# Patient Record
Sex: FEMALE | Race: WHITE | Employment: OTHER | URBAN - METROPOLITAN AREA
[De-identification: names, ages, dates, MRNs, and addresses within clinical notes are randomized per-mention and may not be internally consistent; named-entity substitution may affect disease eponyms.]

---

## 2021-03-26 ENCOUNTER — TELEPHONE (OUTPATIENT)
Dept: WOUND CARE | Age: 86
End: 2021-03-26

## 2021-03-26 NOTE — TELEPHONE ENCOUNTER
Patient Appointment Reminder and [phone] Screening     Attempted to reach patient. Call unanswered, message left to call back.

## 2021-03-29 ENCOUNTER — HOSPITAL ENCOUNTER (OUTPATIENT)
Dept: WOUND CARE | Age: 86
Discharge: HOME | End: 2021-03-29
Payer: MEDICARE

## 2021-03-29 VITALS
TEMPERATURE: 97.5 F | SYSTOLIC BLOOD PRESSURE: 153 MMHG | RESPIRATION RATE: 16 BRPM | HEART RATE: 69 BPM | DIASTOLIC BLOOD PRESSURE: 71 MMHG

## 2021-03-29 DIAGNOSIS — L89.314 PRESSURE INJURY OF RIGHT BUTTOCK, STAGE 4 (HCC): ICD-10-CM

## 2021-03-29 PROBLEM — R63.4 WEIGHT LOSS: Status: ACTIVE | Noted: 2021-03-29

## 2021-03-29 PROBLEM — R15.9 FECAL INCONTINENCE: Status: ACTIVE | Noted: 2021-03-29

## 2021-03-29 PROBLEM — G20 PARKINSON DISEASE (HCC): Status: ACTIVE | Noted: 2021-03-29

## 2021-03-29 PROBLEM — L89.610 PRESSURE INJURY OF RIGHT HEEL, UNSTAGEABLE (HCC): Status: ACTIVE | Noted: 2021-03-29

## 2021-03-29 PROBLEM — R32 URINARY INCONTINENCE: Status: ACTIVE | Noted: 2021-03-29

## 2021-03-29 PROCEDURE — 99204 OFFICE O/P NEW MOD 45 MIN: CPT

## 2021-03-29 PROCEDURE — 11045 DBRDMT SUBQ TISS EACH ADDL: CPT

## 2021-03-29 PROCEDURE — 11042 DBRDMT SUBQ TIS 1ST 20SQCM/<: CPT

## 2021-03-29 NOTE — PROGRESS NOTES
Wound Center  Progress Note / Procedure Note    Subjective:     Chief Complaint:  Clarisse Chandler is a 80 y.o.  female  with multiple wounds of few months duration. Referred by:  formerly [facility]    HPI:     Stay in Assisted living for 2 month - Oct- dec  dtr got call in dec- patient had wound  Recommended she go on hospice  dtr latha patient home  On hospice until last week- took her off as she was having chest pain  Turned out to have low Hgb- received 1uPRBC  Wants to keep her off hospice  Doing well  Appetite much better  She will be taking care of her at home    So far has been sitting up most of the day  Has HH    History/Chart/Medications reviewed    Wound caused by:pressure  Current wound care:See flowsheet  Offloading wound: yes, Group 2, Roho    Appetite: fair, and improving  Wound associated pain: See flowsheet  Diabetic: no  Smoker:no  ROS: no N/V/D, no T/chills; no local rash, no chest pain or shortness of breath, no headache or dizzyness    Review of Systems:  Constitutional: Negative    HENT: Negative. Eyes: Negative. Respiratory: Negative. Cardiovascular: Negative. Gastrointestinal: Negative. Genitourinary: Negative. Musculoskeletal: Negative. Skin: Wound  Neurological: parkinson's  Endo/Heme/Allergies: Negative. Psychiatric/Behavioral: Negative. Objective:     Physical Exam:   See flowsheet / nursing notes for vitals  Visit Vitals  BP (!) 153/71 (BP 1 Location: Left upper arm, BP Patient Position: Lying right side)   Pulse 69   Temp 97.5 °F (36.4 °C)   Resp 16     General: NAD. Hygiene good  Psych: cooperative. No anxiety or depression. Normal mood and affect. Neuro: alert a Otherwise nonfocal.  Derm: NDecreased turgor for age, dry skin  HEENT: Normocephalic, atraumatic. EOMI. Conjunctiva clear. No scleral icterus. Neck: Normal range of motion. No masses.   Chest: Respirations nonlabored  Lower extremities: color normal; temperature normal. Nails dystrophic    Ulcer Description: See Flowsheet           Data Review:              Assessment/Plan     80 y.o. female with parkinsons, recent weight loss, came off hospice last week    -R/ ischium ulcer, stage 4  Some slough  necrotic  Necessitates debridement  for wound healing and to prevent/heal infection  See below      -R/ heel unstageable  Dry eschar    - urinary inoctinence    -fecal incontinence    As per dtr, wound not get contaminated by either    Appetite  Good  Depends on what it is    Following discussed with patient  CG  Needs :  Serial debridement- debrided today- see note below    Good local wound care  Dressing:   Ischium:  Wound vac, in the interim, pack with aquacel ag  Heel: betadine wet to dry  Frequency : three times a week     Continue Home Health      -Nutrition optimization  Increase protein, healthy fats  Reduce sugars/starches    -Good offloading  Has group 2  Reposition q 2 hrs  Limit sitting to 30 min 3x/day  Has Hector  Has offloading boot for heels    CG understood and agrees with plan. Questions answered. Serial visits and serial debridements also discussed. Follow up with me in 1 week        Procedure:     Ulcer assessment: Due to presence of necrotic tissue within the wound bed, ulcer requires debridement. Procedure: Debridement:   The indication for debridement was reviewed with patient. Risks of procedure (bleeding, infection, pain) were discussed with patient/POA and consent signed on first visit. Questions were answered      Muscle excisional debridement  R/ iscihum  Indication: to remove necrotic tissue/ vitalized & devitalized tissue/  infected tissue/ through skin, Subcutaneous, muscle/fascia/periosteal layer of wound bed  Time out done.   Consent in chart   Anesthesia: Topical  lidocaine jelly  Instrument: curette  Residual Necrosis: Present and scored  Bleeding: <1ml   Hemostasis: Pressure   Patient tolerated procedure well   Procedural Pain: 0  Post - procedural pain: 0    Post debridement measurements:see below  Surface area debrided: <20 sq. cm    WOUND POA CONDITIONS    Wound Heel Right;Lateral #1 (Active)   Wound Image   03/29/21 0900   Wound Etiology Pressure Stage unstageble 03/29/21 0900   Dressing Status Intact 03/29/21 0900   Cleansed Cleansed with saline 03/29/21 0900   Wound Length (cm) 1 cm 03/29/21 0900   Wound Width (cm) 1 cm 03/29/21 0900   Wound Depth (cm) 0.1 cm 03/29/21 0900   Wound Surface Area (cm^2) 1 cm^2 03/29/21 0900   Wound Volume (cm^3) 0.1 cm^3 03/29/21 0900   Wound Assessment Eschar dry 03/29/21 0900   Drainage Amount None 03/29/21 0900   Wound Odor None 03/29/21 0900   Gely-Wound/Incision Assessment Dry/flaky 03/29/21 0900   Edges Flat/open edges 03/29/21 0900   Wound Thickness Description Full thickness 03/29/21 0900   Number of days: 0       Wound Ischial Right #2 (Active)   Wound Image   03/29/21 0900   Wound Etiology Pressure Stage 4 03/29/21 0900   Dressing Status Intact 03/29/21 0900   Cleansed Cleansed with saline 03/29/21 0900   Wound Length (cm) 2 cm 03/29/21 0900   Wound Width (cm) 2 cm 03/29/21 0900   Wound Depth (cm) 2 cm 03/29/21 0900   Wound Surface Area (cm^2) 4 cm^2 03/29/21 0900   Wound Volume (cm^3) 8 cm^3 03/29/21 0900   Post-Procedure Length (cm) 2 cm 03/29/21 0927   Post-Procedure Width (cm) 2 cm 03/29/21 0927   Post-Procedure Depth (cm) 2.3 cm 03/29/21 0927   Post-Procedure Surface Area (cm^2) 4 cm^2 03/29/21 0927   Post-Procedure Volume (cm^3) 9.2 cm^3 03/29/21 0927   Distance Tunneling (cm) 3 cm 03/29/21 0900   Direction of Tunnel 9 o'clock 03/29/21 0900   Wound Assessment Pink/red/slough 03/29/21 0900   Drainage Amount Large 03/29/21 0900   Drainage Description Serosanguinous 03/29/21 0900   Wound Odor Mild 03/29/21 0900   Gely-Wound/Incision Assessment Intact; Blanchable erythema 03/29/21 0900   Edges Epibole (rolled edges) 03/29/21 0900   Wound Thickness Description Full thickness 03/29/21 0900   Number of days: 0               -------    Past Medical History:   Diagnosis Date    Anemia NEC     GERD (gastroesophageal reflux disease)     Heart failure (HCC)     Hypertension     [facility] disease ([facility]     Peripheral vascular disease ([facility]       Past Surgical History:   Procedure Laterality Date    HX GYN      hysterectomy    HX ORTHOPAEDIC      hip replacement     History reviewed. No pertinent family history. Social History     Tobacco Use    Smoking status: Never Smoker    Smokeless tobacco: Never Used   Substance Use Topics    Alcohol use: No       Prior to Admission medications    Medication Sig Start Date End Date Taking? Authorizing Provider   calcium-vitamin D (OYSTER SHELL CALCIUM-VIT D3) 250-125 mg-unit tablet Take 1 Tab by mouth daily. Yes Sanaz, MD Fausto   magnesium oxide (MAG-OX) 400 mg tablet Take 400 mg by mouth daily. Yes Fausto Yo MD   ascorbic acid (VITAMIN C) 500 mg tablet Take 500 mg by mouth. Yes Fausto Yo MD   bismuth subsalicylate (BISMATROL) 262 mg/15 mL suspension Take 30 mL by mouth every six (6) hours as needed for Indigestion. Yes Fausto Yo MD   ferrous sulfate (IRON) 325 mg (65 mg Iron) tablet Take 325 mg by mouth daily (before breakfast). Yes Fausto Yo MD   carbidopa-levodopa (SINEMET)  mg per tablet Take 1 Tab by mouth three (3) times daily. Yes Fausto Yo MD   tolterodine (DETROL) 2 mg tablet Take 2 mg by mouth two (2) times a day. Patient takes 4 mg    Fausto Yo MD   pantoprazole (PROTONIX) 40 mg tablet Take 40 mg by mouth daily. Fausto Yo MD   acetaminophen (TYLENOL) 325 mg tablet Take 650 mg by mouth every four (4) hours as needed for Pain.     Fausto Yo MD     Allergies   Allergen Reactions    Latex Rash          Signed By: Haylee Rosario MD     March 29, 2021

## 2021-03-29 NOTE — DISCHARGE INSTRUCTIONS
Discharge Instructions for  [address], [facility] [address]  Telephone: [phone]    NAME:  Clarisse Chandler  YOB: 1927  MEDICAL RECORD NUMBER:  [id number]  DATE:  3/29/2021    Dressings:           Wound Location Right Ishium   Today at clinic: Cleanse with saline and pack with Aquacel AG, cover with gauze, ABD and secure with tape. Change every Monday, Wednesday AND Friday. Home Health: Wound Vac with black foam at 125 mmHg to Right Ischial wound. Change every Monday, Wednesday AND Friday. Dressings:           Wound Location Right heel    Betadine wet to dry and secure with roll gauze. Dietary:  [x] Diet as tolerated: [x] No Added Salt:  [x] Increase Protein: [] Other:   Activity:  [x] Activity as tolerated:  [x] Offload, turn and reposition every 2 hours            Return Appointment:  [x] ECF or Home Healthcare: [address] [place] [address]  [] Wound Assessment:  [x] Return Appointment: With Dr. Roosevelt Carr  in  [address] [place] [address])  [] Ordered tests:      Electronically signed on 3/29/2021 at [facility] 11: Should you experience any significant changes in your wound(s) or have questions about your wound care, please contact the [address] [place] [address] at [address] [place] [address] 8:00 am - 4:30. If you need help with your wound outside these hours and cannot wait until we are again available, contact your PCP or go to the hospital emergency room. PLEASE NOTE: IF YOU ARE UNABLE TO OBTAIN WOUND SUPPLIES, CONTINUE TO USE THE SUPPLIES YOU HAVE AVAILABLE UNTIL YOU ARE ABLE TO REACH US. IT IS MOST IMPORTANT TO KEEP THE WOUND COVERED AT ALL TIMES.       Physician Signature:_______________________    Date: ___________ Time:  ____________

## 2021-03-29 NOTE — WOUND CARE
03/29/21 1046 Wound Heel Right;Lateral #1 Date First Assessed/Time First Assessed: 03/29/21 0908   Present on Hospital Admission: Yes  Primary Wound Type: Pressure Injury  Location: Heel  Wound Location Orientation: Right;Lateral  Wound Description: #1 Dressing/Treatment Betadine swabs/Povidone Iodine;Gauze dressing/dressing sponge;Roll gauze Wound Ischial Right #2 Date First Assessed/Time First Assessed: 03/29/21 0908   Present on Hospital Admission: Yes  Primary Wound Type: Pressure Injury  Location: Ischial  Wound Location Orientation: Right  Wound Description: #2 Dressing/Treatment Alginate with Ag;Gauze dressing/dressing sponge;Roll gauze Discharge Condition: Stable Pain: 0 Ambulatory Status: Wheelchair Discharge Destination: Home Transportation: Car Accompanied by: Self Discharge instructions reviewed with Patient and copy or written instructions have been provided. All questions/concerns have been addressed at this time.

## 2021-03-29 NOTE — WOUND CARE
03/29/21 0900 Anesthetic Anesthetic 4% Lidocaine Cream  
Right Leg Edema Point of Measurement Leg circumference 25 cm Ankle circumference 18 cm Left Leg Edema Point of Measurement Leg circumference 25 cm Ankle circumference 17 cm Peripheral Vascular Peripheral Vascular (WDL) X  
LLE Peripheral Vascular Capillary Refill Less than/equal to 3 seconds Color Appropriate for race Temperature Cool Sensation Present Pedal Pulse Doppler RLE Peripheral Vascular Capillary Refill Less than/equal to 3 seconds Color Appropriate for race Temperature Cool Sensation Present Pedal Pulse Doppler Wound Heel Right;Lateral #1 Date First Assessed/Time First Assessed: 03/29/21 0908   Present on Hospital Admission: Yes  Primary Wound Type: Pressure Injury  Location: Heel  Wound Location Orientation: Right;Lateral  Wound Description: #1 Wound Image Wound Etiology Pressure Stage 3 Dressing Status Intact Cleansed Cleansed with saline Wound Length (cm) 1 cm Wound Width (cm) 1 cm Wound Depth (cm) 0.1 cm Wound Surface Area (cm^2) 1 cm^2 Wound Volume (cm^3) 0.1 cm^3 Wound Assessment Eschar dry Drainage Amount None Wound Odor None Gely-Wound/Incision Assessment Dry/flaky Edges Flat/open edges Wound Thickness Description Full thickness Wound Ischial Right #2 Date First Assessed/Time First Assessed: 03/29/21 0908   Present on Hospital Admission: Yes  Primary Wound Type: Pressure Injury  Location: Ischial  Wound Location Orientation: Right  Wound Description: #2 Wound Image Wound Etiology Pressure Stage 4 Dressing Status Intact Cleansed Cleansed with saline Wound Length (cm) 2 cm Wound Width (cm) 2 cm Wound Depth (cm) 2 cm Wound Surface Area (cm^2) 4 cm^2 Wound Volume (cm^3) 8 cm^3 Distance Tunneling (cm) 3 cm Direction of Tunnel 9 o'clock Wound Assessment Pink/red Drainage Amount Large Drainage Description Serosanguinous Wound Odor Mild Gely-Wound/Incision Assessment Intact; Blanchable erythema Edges Epibole (rolled edges) Wound Thickness Description Full thickness Pain 1 Pain Scale 1 Numeric (0 - 10) Pain Intensity 1 0 Visit Vitals BP (!) 153/71 (BP 1 Location: Left upper arm, BP Patient Position: Lying right side) Pulse 69 Temp 97.5 °F (36.4 °C) Resp 16

## 2021-04-02 ENCOUNTER — TELEPHONE (OUTPATIENT)
Dept: WOUND CARE | Age: 86
End: 2021-04-02

## 2021-04-02 NOTE — TELEPHONE ENCOUNTER
Patient Appointment Reminder and [phone] Screening      Have you been tested for or have you been around anyone that has confirmed or been suspected positive for Covid-19 in past 14 days? No       If yes then  CANCEL VISIT AND MAKE A VIRTUAL VISIT IF POSSIBLE  :     Stay home and monitor your health  · Stay home for 14 days after your last contact with a person who has COVID-19   · Watch for fever (100. 4? F), cough, shortness of breath, or other symptoms of COVID-19 advise CDC website for more information  · If possible, stay away from others, especially people who are at higher risk for getting very sick from COVID-19     Does Patient have fever and/or lower respiratory symptoms, (shortness of breath, difficulty breathing, cough) loss taste or smell, sore throat, muscle or body aches, nasal congestion or runny nose, nausea/vomiting,diarrhea ? No      If yes then CANCEL VISIT AND MAKE A VIRTUAL VISIT IF POSSIBLE :     Referred to PCP or to the closest ED   No         Open travel questions and document patient responses. If No stop here and give patient reminder time for appointment and ask them please limit to having only 1 person accompany to appointment if necessary, no children under 18 are allowed at visits. Please call into office day of appointment to notify arrival.Remind all patients and caretakers they must wear a mask when entering into the wound clinic. Reminder Appointment  date and time given: Yes        [email or web address]     [email or web address]

## 2021-04-05 ENCOUNTER — HOSPITAL ENCOUNTER (OUTPATIENT)
Dept: WOUND CARE | Age: 86
Discharge: HOME | End: 2021-04-05
Payer: MEDICARE

## 2021-04-05 VITALS
SYSTOLIC BLOOD PRESSURE: 125 MMHG | TEMPERATURE: 97.7 F | DIASTOLIC BLOOD PRESSURE: 71 MMHG | RESPIRATION RATE: 16 BRPM | HEART RATE: 74 BPM

## 2021-04-05 PROCEDURE — 11043 DBRDMT MUSC&/FSCA 1ST 20/<: CPT

## 2021-04-05 RX ORDER — SUCRALFATE 1 G/1
1 TABLET ORAL 4 TIMES DAILY
COMMUNITY

## 2021-04-05 RX ORDER — OXYBUTYNIN CHLORIDE 10 MG/1
10 TABLET, EXTENDED RELEASE ORAL DAILY
COMMUNITY
End: 2021-07-21

## 2021-04-05 RX ORDER — FUROSEMIDE 20 MG/1
TABLET ORAL DAILY
COMMUNITY
End: 2022-02-02

## 2021-04-05 RX ORDER — FLUDROCORTISONE ACETATE 0.1 MG/1
0.1 TABLET ORAL DAILY
COMMUNITY

## 2021-04-05 NOTE — WOUND CARE
04/05/21 1023   Wound Heel Right;Lateral #1   Date First Assessed/Time First Assessed: 03/29/21 0908   Present on Hospital Admission: Yes  Primary Wound Type: Pressure Injury  Location: Heel  Wound Location Orientation: Right;Lateral  Wound Description: #1   Dressing/Treatment   (betadine wet to dry, rolled gauze. )   Wound Ischial Right #2   Date First Assessed/Time First Assessed: 03/29/21 0908   Present on Hospital Admission: Yes  Primary Wound Type: Pressure Injury  Location: Ischial  Wound Location Orientation: Right  Wound Description: #2   Dressing/Treatment Alginate with Ag;ABD pad       Discharge Condition: Stable     Pain: 0    Ambulatory Status: Wheelchair     Discharge Destination: Home     Transportation: Car    Accompanied by: Self  and Family/Caregiver     Discharge instructions reviewed with Patient and Family/Caregiver  and copy or written instructions have been provided. All questions/concerns have been addressed at this time.

## 2021-04-05 NOTE — PROGRESS NOTES
Wound Center  Progress Note / Procedure Note    Subjective:     Chief Complaint:  Dali Patino is a 80 y.o.  female  with multiple wounds of few months duration. Referred by:  [facility]    HPI:     Stay in Assisted living for 2 month - Oct- dec  dtr got call in dec- patient had wound  Recommended she go on hospice  dtr latha patient home  On hospice until last week- took her off as she was having chest pain  Turned out to have low Hgb- received 1uPRBC  Wants to keep her off hospice  Doing well  Appetite much better  She will be taking care of her at home    So far has been sitting up most of the day  Has HH    History/Chart/Medications reviewed    Since last visit:  No new issues  Offloading better    Wound caused by:pressure  Current wound care:See flowsheet  Offloading wound: yes, Group 2, Roho    Appetite: fair, and improving  Wound associated pain: See flowsheet  Diabetic: no  Smoker:no  ROS: no N/V/D, no T/chills; no local rash, no chest pain or shortness of breath, no headache or dizzyness      Objective:     Physical Exam:   See flowsheet / nursing notes for vitals  Visit Vitals  /71 (BP 1 Location: Left upper arm, BP Patient Position: Supine)   Pulse 74   Temp 97.7 °F (36.5 °C)   Resp 16     General: NAD. Hygiene good  Psych: cooperative. No anxiety or depression. Normal mood and affect. Neuro: alert a Otherwise nonfocal.  Derm: NDecreased turgor for age, dry skin  HEENT: Normocephalic, atraumatic. EOMI. Conjunctiva clear. No scleral icterus. Neck: Normal range of motion. No masses.   Chest: Respirations nonlabored  Lower extremities: color normal; temperature normal. Nails dystrophic    Ulcer Description:   See Flowsheet           Data Review:              Assessment/Plan     80 y.o. female with parkinsons, recent weight loss, came off hospice last week    -R/ ischium ulcer, stage 4  stable  mild slough  necrotic  Necessitates debridement  for wound healing and to prevent/heal infection  See below      -R/ heel unstageable  Dry eschar    - PAD  PVR reviewed:done on 3/5/21  Normal ankle pressures due to medial wall calcifications  Digit pressures reduced  D/w dtr    - urinary inoctinence    -fecal incontinence    As per dtr, wound not get contaminated by either    Appetite  Good  Depends on what it is    Following discussed with patient  CG  Needs :  Serial debridement- debrided today- see note below    Good local wound care  Dressing:   Ischium:  Wound vac at home,  pack with aquacel ag today here  Heel: betadine wet to dry  Frequency : three times a week     [address] prefers [facility]- they can switch out at their discretion  (we ordered [employer])      -Nutrition optimization  Increase protein, healthy fats  Reduce sugars/starches    -Good offloading  Has group 2  Reposition q 2 hrs  Limit sitting to 30 min 3x/day  Has Hector  Has offloading boot for heels    CG understood and agrees with plan. Questions answered. Serial visits and serial debridements also discussed. Follow up with me in 1 week        Procedure:     Ulcer assessment: Due to presence of necrotic tissue within the wound bed, ulcer requires debridement. Procedure: Debridement:   The indication for debridement was reviewed with patient. Risks of procedure (bleeding, infection, pain) were discussed with patient/POA and consent signed on first visit. Questions were answered      Muscle excisional debridement  R/ ischium  Indication: to remove necrotic tissue/ vitalized & devitalized tissue/  infected tissue/ through skin, Subcutaneous, muscle/fascia/periosteal layer of wound bed  Time out done.   Consent in chart   Anesthesia: Topical  lidocaine jelly  Instrument: curette  Residual Necrosis: Present and scored  Bleeding: <1ml   Hemostasis: Pressure   Patient tolerated procedure well   Procedural Pain: 0  Post - procedural pain: 0    Post debridement measurements:see below  Surface area debrided: <20 sq. cm  WOUND POA CONDITIONS    Wound Heel Right;Lateral #1 (Active)   Wound Image   04/05/21 0927   Wound Etiology Pressure Unstageable 04/05/21 0927   Dressing Status Intact 03/29/21 0900   Cleansed Cleansed with saline 03/29/21 0900   Dressing/Treatment Betadine swabs/Povidone Iodine;Gauze dressing/dressing sponge;Roll gauze 03/29/21 1046   Wound Length (cm) 1 cm 04/05/21 0927   Wound Width (cm) 1 cm 04/05/21 0927   Wound Depth (cm) 0.1 cm 04/05/21 0927   Wound Surface Area (cm^2) 1 cm^2 04/05/21 0927   Change in Wound Size % 0 04/05/21 0927   Wound Volume (cm^3) 0.1 cm^3 04/05/21 0927   Wound Healing % 0 04/05/21 0927   Wound Assessment Eschar dry 04/05/21 0927   Drainage Amount None 04/05/21 0927   Wound Odor None 04/05/21 0927   Gely-Wound/Incision Assessment Dry/flaky 04/05/21 0927   Edges Flat/open edges 04/05/21 0927   Wound Thickness Description Full thickness 04/05/21 0927   Number of days: 7       Wound Ischial Right #2 (Active)   Wound Image   04/05/21 0927   Wound Etiology Pressure Stage 4 04/05/21 0927   Dressing Status Intact 04/05/21 0927   Cleansed Cleansed with saline 04/05/21 0927   Dressing/Treatment Alginate with Ag;Gauze dressing/dressing sponge;Roll gauze 03/29/21 1046   Wound Length (cm) 1.5 cm 04/05/21 0927   Wound Width (cm) 1.5 cm 04/05/21 0927   Wound Depth (cm) 2 cm 04/05/21 0927   Wound Surface Area (cm^2) 2.25 cm^2 04/05/21 0927   Change in Wound Size % 43.75 04/05/21 0927   Wound Volume (cm^3) 4.5 cm^3 04/05/21 0927   Wound Healing % 44 04/05/21 0927   Post-Procedure Length (cm) 1.5 cm 04/05/21 0959   Post-Procedure Width (cm) 1.5 cm 04/05/21 0959   Post-Procedure Depth (cm) 2.7 cm 04/05/21 0959   Post-Procedure Surface Area (cm^2) 2.25 cm^2 04/05/21 0959   Post-Procedure Volume (cm^3) 6.08 cm^3 04/05/21 0959   Distance Tunneling (cm) 2.5 cm 04/05/21 0927   Direction of Tunnel 9 o'clock 04/05/21 0927   Wound Assessment Pink/red/mild slough 04/05/21 0927   Drainage Amount Moderate 04/05/21 0927 Drainage Description Serosanguinous 04/05/21 0927   Wound Odor None 04/05/21 0927   Gely-Wound/Incision Assessment Intact 04/05/21 0927   Edges Flat/open edges 04/05/21 0927   Wound Thickness Description Full thickness 04/05/21 0927   Number of days: 7               -------    Past Medical History:   Diagnosis Date    Anemia NEC     GERD (gastroesophageal reflux disease)     Heart failure (HCC)     Hypertension     [facility] disease ([facility]     Peripheral vascular disease ([facility]       Past Surgical History:   Procedure Laterality Date    HX GYN      hysterectomy    HX ORTHOPAEDIC      hip replacement     Family History   Problem Relation Age of Onset    Heart Disease Father       Social History     Tobacco Use    Smoking status: Never Smoker    Smokeless tobacco: Never Used   Substance Use Topics    Alcohol use: No       Prior to Admission medications    Medication Sig Start Date End Date Taking? Authorizing Provider   furosemide (Lasix) 20 mg tablet Take  by mouth daily. Yes Provider, Historical   sucralfate (CARAFATE) 1 gram tablet Take 1 g by mouth four (4) times daily. Yes Provider, Historical   oxybutynin chloride XL (DITROPAN XL) 10 mg CR tablet Take 10 mg by mouth daily. Yes Provider, Historical   fludrocortisone (FLORINEF) 0.1 mg tablet Take 0.1 mg by mouth daily. Yes Provider, Historical   sennosides (SENNACON PO) Take 10 mL by mouth daily. Yes Provider, Historical   lactulose (CHRONULAC) 10 gram/15 mL solution Take 10 g by mouth two (2) times a day. Yes Provider, Historical   calcium-vitamin D (OYSTER SHELL CALCIUM-VIT D3) 250-125 mg-unit tablet Take 1 Tab by mouth daily. Fausto Yo MD   tolterodine (DETROL) 2 mg tablet Take 2 mg by mouth two (2) times a day. Patient takes 4 mg    Fausto Yo MD   magnesium oxide (MAG-OX) 400 mg tablet Take 400 mg by mouth daily. Fausto Yo MD   pantoprazole (PROTONIX) 40 mg tablet Take 40 mg by mouth daily.     Fausto Yo MD   ascorbic acid (VITAMIN C) 500 mg tablet Take 500 mg by mouth. Fausto Yo MD   acetaminophen (TYLENOL) 325 mg tablet Take 650 mg by mouth every four (4) hours as needed for Pain. Fausto Yo MD   bismuth subsalicylate (BISMATROL) 262 mg/15 mL suspension Take 30 mL by mouth every six (6) hours as needed for Indigestion. Fausto Yo MD   ferrous sulfate (IRON) 325 mg (65 mg Iron) tablet Take 325 mg by mouth daily (before breakfast). Fausto Yo MD   carbidopa-levodopa (SINEMET)  mg per tablet Take 1 Tab by mouth three (3) times daily.     Fausto Yo MD     Allergies   Allergen Reactions    Latex Rash          Signed By: Kiley Asencio MD     April 5, 2021

## 2021-04-05 NOTE — WOUND CARE
Visit Vitals  /71 (BP 1 Location: Left upper arm, BP Patient Position: Supine)   Pulse 74   Temp 97.7 °F (36.5 °C)   Resp 16        04/05/21 0927   Right Leg Edema Point of Measurement   Leg circumference 25 cm   Ankle circumference 18 cm   RLE Peripheral Vascular    Capillary Refill Less than/equal to 3 seconds   Color Appropriate for race   Temperature Warm   Sensation Present   Pedal Pulse Palpable   Wound Heel Right;Lateral #1   Date First Assessed/Time First Assessed: 03/29/21 0908   Present on Hospital Admission: Yes  Primary Wound Type: Pressure Injury  Location: Heel  Wound Location Orientation: Right;Lateral  Wound Description: #1   Wound Image    Wound Etiology Pressure Unstageable   Wound Length (cm) 1 cm   Wound Width (cm) 1 cm   Wound Depth (cm) 0.1 cm   Wound Surface Area (cm^2) 1 cm^2   Change in Wound Size % 0   Wound Volume (cm^3) 0.1 cm^3   Wound Healing % 0   Wound Assessment Eschar dry   Drainage Amount None   Wound Odor None   Gely-Wound/Incision Assessment Dry/flaky   Edges Flat/open edges   Wound Thickness Description Full thickness   Wound Ischial Right #2   Date First Assessed/Time First Assessed: 03/29/21 0908   Present on Hospital Admission: Yes  Primary Wound Type: Pressure Injury  Location: Ischial  Wound Location Orientation: Right  Wound Description: #2   Wound Image    Wound Etiology Pressure Stage 4   Dressing Status Intact   Cleansed Cleansed with saline   Wound Length (cm) 1.5 cm   Wound Width (cm) 1.5 cm   Wound Depth (cm) 2 cm   Wound Surface Area (cm^2) 2.25 cm^2   Change in Wound Size % 43.75   Wound Volume (cm^3) 4.5 cm^3   Wound Healing % 44   Distance Tunneling (cm) 2.5 cm   Direction of Tunnel 9 o'clock   Wound Assessment Pink/red   Drainage Amount Moderate   Drainage Description Serosanguinous   Wound Odor None   Gely-Wound/Incision Assessment Intact   Edges Flat/open edges   Wound Thickness Description Full thickness

## 2021-04-05 NOTE — DISCHARGE INSTRUCTIONS
Discharge Instructions for  [address], [facility] [address]  Telephone: [phone]    NAME:  Leda Catalan  YOB: 1927  MEDICAL RECORD NUMBER:  [id number]  DATE:  4/5/2021    Dressings:           Wound Location Right Ishium   Today at clinic: Cleanse with saline and pack with Aquacel AG (cut into spiral/rope to fill wound bed), cover with gauze, ABD and secure with tape. Change every Monday, Wednesday AND Friday. Home Health: Wound Vac with black foam at 125 mmHg to Right Ischial wound. Change every Monday, and Thursday. Dressings:           Wound Location Right heel    Betadine moistened gauze, then dry gauze and secure with roll gauze. Dietary:  [x] Diet as tolerated: [x] No Added Salt:  [x] Increase Protein: [] Other:   Activity:  [x] Activity as tolerated:  [x] Offload, turn and reposition every 2 hours            Return Appointment:  [x] ECF or Home Healthcare: [address] [place] [address]  [] Wound Assessment:  [x] Return Appointment: With Dr. Drew Dugan  in  1 [facility])  [] Ordered tests:      Electronically signed on 4/5/2021 at 10:00 AM     Stephanie Keller 281: Should you experience any significant changes in your wound(s) or have questions about your wound care, please contact the [address] [place] [address] at [address] [place] [address] 8:00 am - 4:30. If you need help with your wound outside these hours and cannot wait until we are again available, contact your PCP or go to the hospital emergency room. PLEASE NOTE: IF YOU ARE UNABLE TO OBTAIN WOUND SUPPLIES, CONTINUE TO USE THE SUPPLIES YOU HAVE AVAILABLE UNTIL YOU ARE ABLE TO REACH US. IT IS MOST IMPORTANT TO KEEP THE WOUND COVERED AT ALL TIMES.       Physician Signature:_______________________    Date: ___________ Time:  ____________

## 2021-04-13 ENCOUNTER — TELEPHONE (OUTPATIENT)
Dept: WOUND CARE | Age: 86
End: 2021-04-13

## 2021-04-13 NOTE — TELEPHONE ENCOUNTER
Telephoned patient to remind patient of appointment tomorrow. No answer to phone let message for patient.

## 2021-04-14 ENCOUNTER — HOSPITAL ENCOUNTER (OUTPATIENT)
Dept: WOUND CARE | Age: 86
Discharge: HOME | End: 2021-04-14
Payer: MEDICARE

## 2021-04-14 VITALS
RESPIRATION RATE: 18 BRPM | HEART RATE: 76 BPM | TEMPERATURE: 97.7 F | SYSTOLIC BLOOD PRESSURE: 163 MMHG | DIASTOLIC BLOOD PRESSURE: 76 MMHG

## 2021-04-14 PROCEDURE — 11043 DBRDMT MUSC&/FSCA 1ST 20/<: CPT

## 2021-04-14 NOTE — PROGRESS NOTES
04/14/21 1351   Wound Heel Right;Lateral #1   Date First Assessed/Time First Assessed: 03/29/21 0908   Present on Hospital Admission: Yes  Primary Wound Type: Pressure Injury  Location: Heel  Wound Location Orientation: Right;Lateral  Wound Description: #1   Wound Image    Wound Length (cm) 1 cm   Wound Width (cm) 0.5 cm   Wound Depth (cm) 0.1 cm   Wound Surface Area (cm^2) 0.5 cm^2   Change in Wound Size % 50   Wound Volume (cm^3) 0.05 cm^3   Wound Healing % 50   Wound Assessment Eschar dry   Drainage Amount None   Wound Odor None   Wound Ischial Right #2   Date First Assessed/Time First Assessed: 03/29/21 0908   Present on Hospital Admission: Yes  Primary Wound Type: Pressure Injury  Location: Ischial  Wound Location Orientation: Right  Wound Description: #2   Wound Image    Wound Length (cm) 2 cm   Wound Width (cm) 1.5 cm   Wound Depth (cm) 2.2 cm   Wound Surface Area (cm^2) 3 cm^2   Change in Wound Size % 25   Wound Volume (cm^3) 6.6 cm^3   Wound Healing % 18   Wound Assessment Pink/red   Drainage Amount Moderate   Drainage Description Serosanguinous   Wound Odor Mild   Gely-Wound/Incision Assessment Blanchable erythema     Visit Vitals  BP (!) 163/76   Pulse 76   Temp 97.7 °F (36.5 °C)   Resp 18

## 2021-04-14 NOTE — WOUND CARE
04/14/21 1509 Wound Heel Right;Lateral #1 Date First Assessed/Time First Assessed: 03/29/21 0908   Present on Hospital Admission: Yes  Primary Wound Type: Pressure Injury  Location: Heel  Wound Location Orientation: Right;Lateral  Wound Description: #1 Dressing/Treatment  
(Paint with betadine and leave open to air) Wound Ischial Right #2 Date First Assessed/Time First Assessed: 03/29/21 0908   Present on Hospital Admission: Yes  Primary Wound Type: Pressure Injury  Location: Ischial  Wound Location Orientation: Right  Wound Description: #2 Dressing/Treatment Negative Pressure Wound Therapy Negative Pressure NAME:  Chadd Hilliard YOB: 1927 MEDICAL RECORD NUMBER:  [id number] DATE:  4/14/2021  Applied Negative Pressure to  Right ischial wound(s)/ulcer(s).  [x] Applied skin barrier prep to mao-wound.  [x] Cut strips of plastic drape to picture frame wound so that mao-wound is     covered with the drape.  [x] If bridging dressing to less prominent site, cover any intact skin that will come in contact with the Negative Pressure Therapy sponge, gauze or channel drain with plastic drape. The sponge should never touch intact skin.  [x] Cut sponge, gauze or channel drain to size which will fit into the wound/ulcer bed without being forced.  [x] Be sure the sponge is large enough to hold the entire round plastic flange which is attached to the tubing. Never allow flange to be larger than the sponge or it will produce suction damaging intact skin.  Total number of individual pieces of foam used within the wound bed: 1 
 [x] If bridging the dressing away from the primary site, be sure the bridge leads to a piece of sponge large enough to hold the entire flange without allowing any of the flange to overlap onto intact skin.  [x] Covered sponge, gauze or channel drain with plastic drape.   
 [x] Cut a hole in this plastic drape directly over the sponge the same size as the plastic drain tubing.  [x] Removed plastic liner from flange and apply it directly over the hole you cut.  [x] Removed the plastic cover from the flange.  [x] Attached the tubing to the wound/ulcer Negative Pressure Therapy and turn it on to be sure a vacuum is created and that there are no leaks.  [x] If air leaks occur, use plastic drape to patch them.  [x] Secured Negative Pressure Therapy dressing with ace wrap loosely if located on an extremity. Maintain tubing outside of ace wrap. Tubing must not exert pressure on intact skin. Response to treatment: Well tolerated by patient Discharge Condition: Stable Pain: 0 Ambulatory Status: Wheelchair Discharge Destination: Home Transportation: Car Accompanied by: Self and family Discharge instructions reviewed with Patient and Family/Caregiver  and copy or written instructions have been provided. All questions/concerns have been addressed at this time. Applied per [employer] Guidelines Electronically signed by Nelly Lopez RN on 4/14/2021 at 3:10 PM

## 2021-04-14 NOTE — PROGRESS NOTES
Wound Center  Progress Note / Procedure Note    Subjective:     Chief Complaint:  Corrina Coe is a 80 y.o.  female  with multiple wounds of few months duration. Referred by:  [facility] [place]    HPI:     Stay in Assisted living for 2 month - Oct- dec  dtr got call in dec- patient had wound  Recommended she go on hospice  dtr latha patient home  On hospice until last week- took her off as she was having chest pain  Turned out to have low Hgb- received 1uPRBC  Wants to keep her off hospice  Doing well  Appetite much better  She will be taking care of her at home    So far has been sitting up most of the day  Has HH    History/Chart/Medications reviewed    Since last visit:  No new issues      Wound caused by:pressure  Current wound care:See flowsheet  Offloading wound: yes, Group 2, Roho    Appetite: fair, and improving  Wound associated pain: See flowsheet  Diabetic: no  Smoker:no  ROS: no N/V/D, no T/chills; no local rash, no chest pain or shortness of breath, no headache or dizzyness      Objective:     Physical Exam:   See flowsheet / nursing notes for vitals  Visit Vitals  BP (!) 163/76   Pulse 76   Temp 97.7 °F (36.5 °C)   Resp 18     General: NAD. Hygiene good  Psych: cooperative. No anxiety or depression. Normal mood and affect. Neuro: alert a Otherwise nonfocal.  Derm: NDecreased turgor for age, dry skin  HEENT: Normocephalic, atraumatic. EOMI. Conjunctiva clear. No scleral icterus. Neck: Normal range of motion. No masses.   Chest: Respirations nonlabored  Lower extremities: color normal; temperature normal. Nails dystrophic    Ulcer Description:   See Flowsheet           Data Review:              Assessment/Plan     80 y.o. female with parkinsons, recent weight loss, came off hospice last week    -R/ ischium ulcer, stage 4  stable  mild slough  necrotic  Necessitates debridement  for wound healing and to prevent/heal infection  See below      -R/ heel unstageable  Dry eschar    - PAD  PVR reviewed:done on 3/5/21  Normal ankle pressures due to medial wall calcifications  Digit pressures reduced      - urinary inoctinence    -fecal incontinence      Appetite  Good      Following discussed with patient  CG  Needs :  Serial debridement- debrided today- see note below    Good local wound care  Dressing:   Ischium:  Collagen, Wound vac   Heel: betadine and leave open  Frequency : three times a week     Continue Home Health        -Nutrition optimization  Increase protein, healthy fats  Reduce sugars/starches    -Good offloading  Has group 2  Reposition q 2 hrs  Limit sitting to 30 min 3x/day  Has Hector  Has offloading boot for heels    CG understood and agrees with plan. Questions answered. Serial visits and serial debridements also discussed. Follow up with me in 2 week        Procedure:     Ulcer assessment: Due to presence of necrotic tissue within the wound bed, ulcer requires debridement. Procedure: Debridement:   The indication for debridement was reviewed with patient. Risks of procedure (bleeding, infection, pain) were discussed with patient/POA and consent signed on first visit. Questions were answered      Muscle excisional debridement  R/ ischium  Indication: to remove necrotic tissue/ vitalized & devitalized tissue/  infected tissue/ through skin, Subcutaneous, muscle/fascia/periosteal layer of wound bed  Time out done.   Consent in chart   Anesthesia: Topical  lidocaine jelly  Instrument: curette  Residual Necrosis: Present and scored  Bleeding: <1ml   Hemostasis: Pressure   Patient tolerated procedure well   Procedural Pain: 0  Post - procedural pain: 0    Post debridement measurements:see below  Surface area debrided: <20 sq. cm  WOUND POA CONDITIONS    Wound Heel Right;Lateral #1 (Active)   Wound Image   04/14/21 1351   Wound Etiology Pressure Unstageable 04/05/21 0927   Dressing Status Intact 03/29/21 0900   Cleansed Cleansed with saline 03/29/21 0900   Dressing/Treatment Betadine swabs/Povidone Iodine;Gauze dressing/dressing sponge;Roll gauze 03/29/21 1046   Wound Length (cm) 1 cm 04/14/21 1351   Wound Width (cm) 0.5 cm 04/14/21 1351   Wound Depth (cm) 0.1 cm 04/14/21 1351   Wound Surface Area (cm^2) 0.5 cm^2 04/14/21 1351   Change in Wound Size % 50 04/14/21 1351   Wound Volume (cm^3) 0.05 cm^3 04/14/21 1351   Wound Healing % 50 04/14/21 1351   Wound Assessment Eschar dry 04/14/21 1351   Drainage Amount None 04/14/21 1351   Wound Odor None 04/14/21 1351   Gely-Wound/Incision Assessment Dry/flaky 04/05/21 0927   Edges Flat/open edges 04/05/21 0927   Wound Thickness Description Full thickness 04/05/21 0927   Number of days: 16       Wound Ischial Right #2 (Active)   Wound Image   04/14/21 1351   Wound Etiology Pressure Stage 4 04/05/21 0927   Dressing Status Intact 04/05/21 0927   Cleansed Cleansed with saline 04/05/21 0927   Dressing/Treatment Alginate with Ag;ABD pad 04/05/21 1023   Wound Length (cm) 1.6 cm 04/14/21 1351   Wound Width (cm) 1.7 cm 04/14/21 1351   Wound Depth (cm) 2.2 cm 04/14/21 1351   Wound Surface Area (cm^2) 2.72 cm^2 04/14/21 1351   Change in Wound Size % 32 04/14/21 1351   Wound Volume (cm^3) 5.98 cm^3 04/14/21 1351   Wound Healing % 25 04/14/21 1351   Post-Procedure Length (cm) 2 cm 04/14/21 1356   Post-Procedure Width (cm) 1.7 cm 04/14/21 1356   Post-Procedure Depth (cm) 2.4 cm 04/14/21 1356   Post-Procedure Surface Area (cm^2) 3.4 cm^2 04/14/21 1356   Post-Procedure Volume (cm^3) 8.16 cm^3 04/14/21 1356   Distance Tunneling (cm) 2.5 cm 04/05/21 0927   Direction of Tunnel 9 o'clock 04/05/21 0927   Wound Assessment Pink/red/mild slough 04/14/21 1351   Drainage Amount Moderate 04/14/21 1351   Drainage Description Serosanguinous 04/14/21 1351   Wound Odor Mild 04/14/21 1351   Gely-Wound/Incision Assessment Blanchable erythema 04/14/21 1351   Edges Flat/open edges 04/05/21 0927   Wound Thickness Description Full thickness 04/05/21 0927   Number of days: 16           -------    Past Medical History:   Diagnosis Date    Anemia NEC     GERD (gastroesophageal reflux disease)     Heart failure (HCC)     Hypertension     Parkinson disease ([facility]     Peripheral vascular disease ([facility]       Past Surgical History:   Procedure Laterality Date    HX GYN      hysterectomy    HX ORTHOPAEDIC      hip replacement     Family History   Problem Relation Age of Onset    Heart Disease Father       Social History     Tobacco Use    Smoking status: Never Smoker    Smokeless tobacco: Never Used   Substance Use Topics    Alcohol use: No       Prior to Admission medications    Medication Sig Start Date End Date Taking? Authorizing Provider   furosemide (Lasix) 20 mg tablet Take  by mouth daily. Yes Provider, Historical   sucralfate (CARAFATE) 1 gram tablet Take 1 g by mouth four (4) times daily. Yes Provider, Historical   oxybutynin chloride XL (DITROPAN XL) 10 mg CR tablet Take 10 mg by mouth daily. Yes Provider, Historical   fludrocortisone (FLORINEF) 0.1 mg tablet Take 0.1 mg by mouth daily. Yes Provider, Historical   sennosides (SENNACON PO) Take 10 mL by mouth daily. Yes Provider, Historical   lactulose (CHRONULAC) 10 gram/15 mL solution Take 10 g by mouth two (2) times a day. Yes Provider, Historical   calcium-vitamin D (OYSTER SHELL CALCIUM-VIT D3) 250-125 mg-unit tablet Take 1 Tab by mouth daily. Yes Other, MD Fausto   tolterodine (DETROL) 2 mg tablet Take 2 mg by mouth two (2) times a day. Patient takes 4 mg   Yes Other, MD Fausto   magnesium oxide (MAG-OX) 400 mg tablet Take 400 mg by mouth daily. Yes Sanaz, MD Fausto   pantoprazole (PROTONIX) 40 mg tablet Take 40 mg by mouth daily. Yes Sanaz, MD Fausto   ascorbic acid (VITAMIN C) 500 mg tablet Take 500 mg by mouth. Yes Sanaz, MD Fausto   acetaminophen (TYLENOL) 325 mg tablet Take 650 mg by mouth every four (4) hours as needed for Pain.    Yes Sanaz, MD Fausto   bismuth subsalicylate (BISMATROL) 262 mg/15 mL suspension Take 30 mL by mouth every six (6) hours as needed for Indigestion. Yes Other, MD Fausto   ferrous sulfate (IRON) 325 mg (65 mg Iron) tablet Take 325 mg by mouth daily (before breakfast). Yes Other, MD Fausto   carbidopa-levodopa (SINEMET)  mg per tablet Take 1 Tab by mouth three (3) times daily.    Yes Other, MD Fausto     Allergies   Allergen Reactions    Latex Rash          Signed By: Corrina Roberts MD     April 14, 2021

## 2021-04-14 NOTE — DISCHARGE INSTRUCTIONS
Discharge Instructions for  [place] [address]  Telephone: [phone]    NAME:  Hailey Simons  YOB: 1927  MEDICAL RECORD NUMBER:  [id number]  DATE:  4/14/2021    Dressings:           Wound Location Right Ishium   Today at clinic: Cleanse with saline and pack with Aquacel AG (cut into spiral/rope to fill wound bed), cover with gauze, ABD and secure with tape. Change every Monday, Wednesday AND Friday. Home Health: Maxine to wound bed, then Wound Vac with black foam at 125 mmHg to Right Ischial wound. Change every Monday, and Thursday. Dressings:           Wound Location Right heel    Paint with betadine and leave open to air    Dietary:  [x] Diet as tolerated: [x] No Added Salt:  [x] Increase Protein: [] Other:   Activity:  [x] Activity as tolerated:  [x] Offload, turn and reposition every 2 hours            Return Appointment:  [x] ECF or Home Healthcare: [address] [place] [address]  [] Wound Assessment:  [x] Return Appointment: With Dr. Rickey Nichols  in  2 [facility])  [] Ordered tests:      Electronically signed on 4/14/2021 at 2:18 PM     [facility] Information: Should you experience any significant changes in your wound(s) or have questions about your wound care, please contact the [address] [place] [address] at [address] [place] [address] 8:00 am - 4:30. If you need help with your wound outside these hours and cannot wait until we are again available, contact your PCP or go to the hospital emergency room. PLEASE NOTE: IF YOU ARE UNABLE TO OBTAIN WOUND SUPPLIES, CONTINUE TO USE THE SUPPLIES YOU HAVE AVAILABLE UNTIL YOU ARE ABLE TO REACH US. IT IS MOST IMPORTANT TO KEEP THE WOUND COVERED AT ALL TIMES.       Physician Signature:_______________________    Date: ___________ Time:  ____________

## 2021-04-27 ENCOUNTER — TELEPHONE (OUTPATIENT)
Dept: WOUND CARE | Age: 86
End: 2021-04-27

## 2021-04-28 ENCOUNTER — HOSPITAL ENCOUNTER (OUTPATIENT)
Dept: WOUND CARE | Age: 86
Discharge: HOME | End: 2021-04-28
Payer: MEDICARE

## 2021-04-28 VITALS
TEMPERATURE: 98.1 F | HEART RATE: 73 BPM | SYSTOLIC BLOOD PRESSURE: 144 MMHG | RESPIRATION RATE: 18 BRPM | DIASTOLIC BLOOD PRESSURE: 73 MMHG

## 2021-04-28 PROCEDURE — 11043 DBRDMT MUSC&/FSCA 1ST 20/<: CPT

## 2021-04-28 NOTE — PROGRESS NOTES
04/28/21 1334   Wound Ischial Right #2   Date First Assessed/Time First Assessed: 03/29/21 0908   Present on Hospital Admission: Yes  Primary Wound Type: Pressure Injury  Location: Ischial  Wound Location Orientation: Right  Wound Description: #2   Wound Image    Wound Length (cm) 1.3 cm   Wound Width (cm) 1.1 cm   Wound Depth (cm) 1.8 cm   Wound Surface Area (cm^2) 1.43 cm^2   Change in Wound Size % 64.25   Wound Volume (cm^3) 2.57 cm^3   Wound Healing % 68   Wound Assessment Pink/red   Drainage Amount Moderate   Drainage Description Serosanguinous   Wound Odor Mild   Gely-Wound/Incision Assessment Blanchable erythema   Edges Flat/open edges   Wound Heel Right;Lateral #1   Date First Assessed/Time First Assessed: 03/29/21 0908   Present on Hospital Admission: Yes  Primary Wound Type: Pressure Injury  Location: Heel  Wound Location Orientation: Right;Lateral  Wound Description: #1   Wound Image    Wound Length (cm) 0.3 cm   Wound Width (cm) 0.5 cm   Wound Depth (cm) 0.1 cm   Wound Surface Area (cm^2) 0.15 cm^2   Change in Wound Size % 85   Wound Volume (cm^3) 0.02 cm^3   Wound Healing % 80   Wound Assessment Eschar dry   Drainage Amount None   Wound Odor None     Visit Vitals  BP (!) 144/73   Pulse 73   Temp 98.1 °F (36.7 °C)   Resp 18

## 2021-04-28 NOTE — WOUND CARE
04/28/21 1440   Wound Ischial Right #2   Date First Assessed/Time First Assessed: 03/29/21 0908   Present on Hospital Admission: Yes  Primary Wound Type: Pressure Injury  Location: Ischial  Wound Location Orientation: Right  Wound Description: #2   Dressing Status New dressing applied   Dressing/Treatment Collagen with Ag;Negative Pressure Wound Therapy   Undermining Starts ___ O'Clock 9 o'clock   Undermining Ends ___ O'Clock 12 o'clock   Undermining Maximum Distance (cm) 2.3 cm     Discharge Condition: Stable     Pain: 0    Ambulatory Status: Wheelchair     Discharge Destination: Home     Transportation: Car    Accompanied by: Self  and Family/Caregiver     Discharge instructions reviewed with Patient and Family/Caregiver  and copy or written instructions have been provided. All questions/concerns have been addressed at this time. Negative Pressure    NAME:  Anna Salinas  YOB: 1927  MEDICAL RECORD NUMBER:  [id number]  DATE:  4/28/2021     Applied Negative Pressure to ischial wound(s)/ulcer(s).  [x] Applied skin barrier prep to mao-wound.  [x] Cut strips of plastic drape to picture frame wound so that mao-wound is     covered with the drape.  [x] If bridging dressing to less prominent site, cover any intact skin that will come in contact with the Negative Pressure Therapy sponge, gauze or channel drain with plastic drape. The sponge should never touch intact skin.  [x] Cut sponge, gauze or channel drain to size which will fit into the wound/ulcer bed without being forced.  [x] Be sure the sponge is large enough to hold the entire round plastic flange which is attached to the tubing. Never allow flange to be larger than the sponge or it will produce suction damaging intact skin.    Total number of individual pieces of foam used within the wound bed: 1 foam, 1 bridge foam   [x] If bridging the dressing away from the primary site, be sure the bridge leads to a piece of sponge large enough to hold the entire flange without allowing any of the flange to overlap onto intact skin.  [x] Covered sponge, gauze or channel drain with plastic drape.  [x] Cut a hole in this plastic drape directly over the sponge the same size as the plastic drain tubing.  [x] Removed plastic liner from flange and apply it directly over the hole you cut.  [x] Removed the plastic cover from the flange.  [x] Attached the tubing to the wound/ulcer Negative Pressure Therapy and turn it on to be sure a vacuum is created and that there are no leaks.  [x] If air leaks occur, use plastic drape to patch them.  [x] Secured Negative Pressure Therapy dressing with ace wrap loosely if located on an extremity. Maintain tubing outside of ace wrap. Tubing must not exert pressure on intact skin.     Response to treatment: Well tolerated by patient      Applied per  Guidelines      Electronically signed by Dorys Monsivais RN on 4/28/2021 at 2:40 PM

## 2021-04-28 NOTE — PROGRESS NOTES
Wound Center  Progress Note / Procedure Note    Subjective:     Chief Complaint:  Brandon Celis is a 80 y.o.  female  with multiple wounds of few months duration. Referred by:  [facility]    HPI:     Stay in Assisted living for 2 month - Oct- dec  dtr got call in dec- patient had wound  Recommended she go on hospice  dtr latha patient home    Doing well  Appetite much better  She will be taking care of her at home      History/Chart/Medications reviewed    Since last visit:  No new issues  Wound care going well  Appetite pretty good      Wound caused by:pressure  Current wound care:See flowsheet  Offloading wound: yes, Group 2, Roho    Appetite: fair, and improving  Wound associated pain: See flowsheet  Diabetic: no  Smoker:no  ROS: no N/V/D, no T/chills; no local rash, no chest pain or shortness of breath, no headache or dizzyness      Objective:     Physical Exam:   See flowsheet / nursing notes for vitals  Visit Vitals  BP (!) 144/73   Pulse 73   Temp 98.1 °F (36.7 °C)   Resp 18     General: NAD. Hygiene good  Psych: cooperative. No anxiety or depression. Normal mood and affect. Neuro: alert a Otherwise nonfocal.  Derm: NDecreased turgor for age, dry skin  HEENT: Normocephalic, atraumatic. EOMI. Conjunctiva clear. No scleral icterus. Neck: Normal range of motion. No masses.   Chest: Respirations nonlabored  Lower extremities: color normal; temperature normal. Nails dystrophic    Ulcer Description:   See Flowsheet           Data Review:          43/collagen powder    Assessment/Plan     80 y.o. female with parkinsons, recent weight loss, came off hospice last week    -R/ ischium ulcer, stage 4  Smaller SA  mild slough  necrotic  Necessitates debridement  for wound healing and to prevent/heal infection  See below      -R/ heel unstageable  Dry eschar    - PAD  PVR reviewed:done on 3/5/21  Normal ankle pressures due to medial wall calcifications  Digit pressures reduced      - urinary inoctinence    -fecal incontinence      Appetite  Good      Following discussed with patient  CG  Needs :  Serial debridement- debrided today- see note below    Good local wound care  Dressing:   Ischium:  Collagen, Wound vac   Heel: betadine and leave open  Frequency : three times a week     Continue Home Health        -Nutrition optimization  Increase protein, healthy fats  Reduce sugars/starches  ADD protein and collagen supplements    -Good offloading  Has group 2  Candidate for group 3  -Good offloading  Discussed in detail  A regular re-positioning schedule is in place  Needs better offloading  - group 3- dolphin/clinitron  This patient could benefit from a group 3 air fluidize therapy in the home. Nutrition is optimized. Patient is appropriately turned and repositioned and currently has a Group 2 mattress for more than 30 days and the wound continues to deteriorate. This patient must be placed on air fluidize to begin healing and prevent new wounds from developing. In the absence of an air fluidize therapy the patient may require hospitalization. Air fluidized therapy has been discussed with patient and POA and all are in agreement. CG understood and agrees with plan. Questions answered. Serial visits and serial debridements also discussed. Follow up with me in 2 week        Procedure:     Ulcer assessment: Due to presence of necrotic tissue within the wound bed, ulcer requires debridement. Procedure: Debridement:   The indication for debridement was reviewed with patient. Risks of procedure (bleeding, infection, pain) were discussed with patient/POA and consent signed on first visit. Questions were answered      Muscle excisional debridement  R/ ischium  Indication: to remove necrotic tissue/ vitalized & devitalized tissue/  infected tissue/ through skin, Subcutaneous, muscle/fascia/periosteal layer of wound bed  Time out done.   Consent in chart   Anesthesia: Topical  lidocaine jelly  Instrument: curette  Residual Necrosis: Present and scored  Bleeding: <1ml   Hemostasis: Pressure   Patient tolerated procedure well   Procedural Pain: 0  Post - procedural pain: 0    Post debridement measurements:see below  Surface area debrided: <20 sq. cm  WOUND POA CONDITIONS    Wound Heel Right;Lateral #1 (Active)   Wound Image   04/28/21 1334   Wound Etiology Pressure Unstageable 04/05/21 0927   Dressing Status Intact 03/29/21 0900   Cleansed Cleansed with saline 03/29/21 0900   Dressing/Treatment Betadine swabs/Povidone Iodine;Gauze dressing/dressing sponge;Roll gauze 03/29/21 1046   Wound Length (cm) 0.3 cm 04/28/21 1334   Wound Width (cm) 0.5 cm 04/28/21 1334   Wound Depth (cm) 0.1 cm 04/28/21 1334   Wound Surface Area (cm^2) 0.15 cm^2 04/28/21 1334   Change in Wound Size % 85 04/28/21 1334   Wound Volume (cm^3) 0.02 cm^3 04/28/21 1334   Wound Healing % 80 04/28/21 1334   Post-Procedure Length (cm) 0.5 cm 04/28/21 1408   Post-Procedure Width (cm) 0.7 cm 04/28/21 1408   Post-Procedure Depth (cm) 0.3 cm 04/28/21 1408   Post-Procedure Surface Area (cm^2) 0.35 cm^2 04/28/21 1408   Post-Procedure Volume (cm^3) 0.1 cm^3 04/28/21 1408   Wound Assessment Eschar dry 04/28/21 1334   Drainage Amount None 04/28/21 1334   Wound Odor None 04/28/21 1334   Gely-Wound/Incision Assessment Dry/flaky 04/05/21 0927   Edges Flat/open edges 04/05/21 0927   Wound Thickness Description Full thickness 04/05/21 0927   Number of days: 30       Wound Ischial Right #2 (Active)   Wound Image   04/28/21 1334   Wound Etiology Pressure Stage 4 04/05/21 0927   Dressing Status New dressing applied 04/28/21 1440   Cleansed Cleansed with saline 04/05/21 0927   Dressing/Treatment Collagen with Ag;Negative Pressure Wound Therapy 04/28/21 1440   Wound Length (cm) 1.3 cm 04/28/21 1334   Wound Width (cm) 1.1 cm 04/28/21 1334   Wound Depth (cm) 1.8 cm 04/28/21 1334   Wound Surface Area (cm^2) 1.43 cm^2 04/28/21 1334   Change in Wound Size % 64.25 04/28/21 1334 Wound Volume (cm^3) 2.57 cm^3 04/28/21 1334   Wound Healing % 68 04/28/21 1334   Post-Procedure Length (cm) 2 cm 04/14/21 1356   Post-Procedure Width (cm) 1.7 cm 04/14/21 1356   Post-Procedure Depth (cm) 2.4 cm 04/14/21 1356   Post-Procedure Surface Area (cm^2) 3.4 cm^2 04/14/21 1356   Post-Procedure Volume (cm^3) 8.16 cm^3 04/14/21 1356   Distance Tunneling (cm) 2.5 cm 04/05/21 0927   Direction of Tunnel 9 o'clock 04/05/21 0927   Undermining Starts ___ O'Clock 9 o'clock 04/28/21 1440   Undermining Ends ___ O'Clock 12 o'clock 04/28/21 1440   Undermining Maximum Distance (cm) 2.3 cm 04/28/21 1440   Wound Assessment Pink/red/slough 04/28/21 1334   Drainage Amount Moderate 04/28/21 1334   Drainage Description Serosanguinous 04/28/21 1334   Wound Odor Mild 04/28/21 1334   Gely-Wound/Incision Assessment Blanchable erythema 04/28/21 1334   Edges Flat/open edges 04/28/21 1334   Wound Thickness Description Full thickness 04/05/21 0927   Number of days: 27           -------    Past Medical History:   Diagnosis Date    Anemia NEC     GERD (gastroesophageal reflux disease)     Heart failure (HCC)     Hypertension     Parkinson disease ([facility]     Peripheral vascular disease ([facility]       Past Surgical History:   Procedure Laterality Date    HX GYN      hysterectomy    HX ORTHOPAEDIC      hip replacement     Family History   Problem Relation Age of Onset    Heart Disease Father       Social History     Tobacco Use    Smoking status: Never Smoker    Smokeless tobacco: Never Used   Substance Use Topics    Alcohol use: No       Prior to Admission medications    Medication Sig Start Date End Date Taking? Authorizing Provider   furosemide (Lasix) 20 mg tablet Take  by mouth daily. Provider, Historical   sucralfate (CARAFATE) 1 gram tablet Take 1 g by mouth four (4) times daily. Provider, Historical   oxybutynin chloride XL (DITROPAN XL) 10 mg CR tablet Take 10 mg by mouth daily.     Provider, Historical fludrocortisone (FLORINEF) 0.1 mg tablet Take 0.1 mg by mouth daily. Provider, Historical   sennosides (SENNACON PO) Take 10 mL by mouth daily. Provider, Historical   lactulose (CHRONULAC) 10 gram/15 mL solution Take 10 g by mouth two (2) times a day. Provider, Historical   calcium-vitamin D (OYSTER SHELL CALCIUM-VIT D3) 250-125 mg-unit tablet Take 1 Tab by mouth daily. Fausto Yo MD   tolterodine (DETROL) 2 mg tablet Take 2 mg by mouth two (2) times a day. Patient takes 4 mg    Fausto Yo MD   magnesium oxide (MAG-OX) 400 mg tablet Take 400 mg by mouth daily. Fausto Yo MD   pantoprazole (PROTONIX) 40 mg tablet Take 40 mg by mouth daily. Fausto Yo MD   ascorbic acid (VITAMIN C) 500 mg tablet Take 500 mg by mouth. Fausto Yo MD   acetaminophen (TYLENOL) 325 mg tablet Take 650 mg by mouth every four (4) hours as needed for Pain. Fausto Yo MD   bismuth subsalicylate (BISMATROL) 262 mg/15 mL suspension Take 30 mL by mouth every six (6) hours as needed for Indigestion. Fausto Yo MD   ferrous sulfate (IRON) 325 mg (65 mg Iron) tablet Take 325 mg by mouth daily (before breakfast). Fausto Yo MD   carbidopa-levodopa (SINEMET)  mg per tablet Take 1 Tab by mouth three (3) times daily.     Fausto Yo MD     Allergies   Allergen Reactions    Latex Rash          Signed By: Maximino Bright MD     April 28, 2021

## 2021-04-28 NOTE — DISCHARGE INSTRUCTIONS
Discharge Instructions for  [address]  Telephone: [phone]    NAME:  Maldonado Becerril  YOB: 1927  MEDICAL RECORD NUMBER:  [id number]  DATE:  4/28/2021    Dressings:           Wound Location Right Ishium   Cleanse with saline and Collagen to wound bed, pack with white foam to tunneling and undermining and black foam at 125 mmHg to the rest of the wound Change every Monday, AND thursday. Dressings:           Wound Location Right heel    Paint with betadine and leave open to air    Dietary:  [x] Diet as tolerated: [x] No Added Salt:  [x] Increase Protein: [] Other:   Activity:  [x] Activity as tolerated:  [x] Offload, turn and reposition every 2 hours            Return Appointment:  [x] ECF or Home Healthcare: [address] [place] [address]  [] Wound Assessment:  [x] Return Appointment: With Dr. Eliu Cox  in  [address] [place] [address]  [] Ordered tests: Will begin process to order Group 3 bed. Electronically signed on 4/28/2021 at 2:05 PM     Stephanie Keller [address]: Should you experience any significant changes in your wound(s) or have questions about your wound care, please contact the [address] [place] [address] at [address] [place] [address] 8:00 am - 4:30. If you need help with your wound outside these hours and cannot wait until we are again available, contact your PCP or go to the hospital emergency room. PLEASE NOTE: IF YOU ARE UNABLE TO OBTAIN WOUND SUPPLIES, CONTINUE TO USE THE SUPPLIES YOU HAVE AVAILABLE UNTIL YOU ARE ABLE TO REACH US. IT IS MOST IMPORTANT TO KEEP THE WOUND COVERED AT ALL TIMES.       Physician Signature:_______________________    Date: ___________ Time:  ____________

## 2021-05-18 ENCOUNTER — TELEPHONE (OUTPATIENT)
Dept: WOUND CARE | Age: 86
End: 2021-05-18

## 2021-05-19 ENCOUNTER — HOSPITAL ENCOUNTER (OUTPATIENT)
Dept: WOUND CARE | Age: 86
Discharge: HOME | End: 2021-05-19
Payer: MEDICARE

## 2021-05-19 VITALS
HEART RATE: 79 BPM | DIASTOLIC BLOOD PRESSURE: 70 MMHG | SYSTOLIC BLOOD PRESSURE: 145 MMHG | RESPIRATION RATE: 18 BRPM | TEMPERATURE: 97.5 F

## 2021-05-19 PROCEDURE — 97605 NEG PRS WND THER DME<=50SQCM: CPT

## 2021-05-19 NOTE — DISCHARGE INSTRUCTIONS
Discharge Instructions for  [address], [address]  Telephone: [phone]    NAME:  Wan Krishna  YOB: 1927  MEDICAL RECORD NUMBER:  [id number]  DATE:  5/19/2021    Dressings:           Wound Location Right Ishium   Cleanse with quarter strength dakins and rinse well with saline. Apply collagen to wound bed, then wound vac. Pack with white foam to tunneling and undermining and black foam at 125 mmHg to the rest of the wound. Change every Monday, Wednesday, and Friday. Dressings:           Wound Location Right heel    Paint with betadine and leave open to air. Pressure Relief:  [x] When sitting, shift position or do seat lifts every 15 minutes. [x] Wheelchair cushion [x] Specialty Bed/Mattress  [x] Turn every 2 hours when in bed. Avoid position directing pressure on wound site. Limit side lying to 30 degree tilt. Limit HOB elevation to 30 degrees. Dietary:  [x] Diet as tolerated: [x] No Added Salt: [x] Increase Protein: protein drinks after meals             Return Appointment:  [x] ECF or Home Healthcare: [address] [place] [address]  [x] Return Appointment: With Dr. Rickie Murdock  in  [address] [place] [address])    Will begin process to order Group 3 bed. Electronically signed on 5/19/2021 at 10:05 [address] [facility] [address]: Should you experience any significant changes in your wound(s) or have questions about your wound care, please contact the [address] [place] [address] at [address] [place] [address] 8:00 am - 4:30. If you need help with your wound outside these hours and cannot wait until we are again available, contact your PCP or go to the hospital emergency room. PLEASE NOTE: IF YOU ARE UNABLE TO OBTAIN WOUND SUPPLIES, CONTINUE TO USE THE SUPPLIES YOU HAVE AVAILABLE UNTIL YOU ARE ABLE TO REACH US. IT IS MOST IMPORTANT TO KEEP THE WOUND COVERED AT ALL TIMES.       Physician Signature:_______________________    Date: ___________ Time:  ____________

## 2021-05-19 NOTE — PROGRESS NOTES
05/19/21 0926   Wound Heel Right;Lateral #1   Date First Assessed/Time First Assessed: 03/29/21 0908   Present on Hospital Admission: Yes  Primary Wound Type: Pressure Injury  Location: Heel  Wound Location Orientation: Right;Lateral  Wound Description: #1   Wound Image    Wound Length (cm) 0.1 cm   Wound Width (cm) 0.1 cm   Wound Depth (cm) 0.1 cm   Wound Surface Area (cm^2) 0.01 cm^2   Change in Wound Size % 99   Wound Volume (cm^3) 0 cm^3   Wound Healing % 100   Wound Assessment Eschar dry   Drainage Amount None   Wound Odor None   Gely-Wound/Incision Assessment Dry/flaky   Wound Ischial Right #2   Date First Assessed/Time First Assessed: 03/29/21 0908   Present on Hospital Admission: Yes  Primary Wound Type: Pressure Injury  Location: Ischial  Wound Location Orientation: Right  Wound Description: #2   Wound Image    Wound Length (cm) 1.7 cm   Wound Width (cm) 0.8 cm   Wound Depth (cm) 1.7 cm   Wound Surface Area (cm^2) 1.36 cm^2   Change in Wound Size % 66   Wound Volume (cm^3) 2.31 cm^3   Wound Healing % 71   Undermining Starts ___ O'Clock 9 o'clock   Undermining Ends ___ O'Clock 12 o'clock   Undermining Maximum Distance (cm) 2.5 cm   Wound Assessment Pink/red   Drainage Amount Moderate   Drainage Description Serosanguinous   Wound Odor Mild   Gely-Wound/Incision Assessment Blanchable erythema; Maceration     Visit Vitals  BP (!) 145/70   Pulse 79   Temp 97.5 °F (36.4 °C)   Resp 18

## 2021-05-19 NOTE — WOUND CARE
Wound Care Progress Note Assessment:  
 
80 y.o. 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  
 
Wound Location Right Ischium Cleanse with quarter strength Dakins and rinse well with saline. Apply collagen to wound bed, then wound vac. Pack with white foam to tunneling and undermining and black foam at 125 mmHg to the rest of the wound. Change every Monday, Wednesday, and Friday. Wound Location Right heel Paint with betadine and leave open to air. Pressure Relief: When sitting, shift position or do seat lifts every 15 minutes. Wheelchair cushion, specialty Bed/Mattress Turn every 2 hours when in bed. Avoid position directing pressure on wound site. Limit side lying to 30 degree tilt. Limit HOB elevation to 30 degrees. Dietary: 
Diet as tolerated, No Added Salt, increase Protein: protein drinks after meals Will begin process to order Group 3 bed. Home Health ordered: yes How often: 3 times per week Patient and daughter understood and agrees with plan. Questions answered. Weekly visits and serial debridements also discussed. Follow up with me in 4 week. Subject:  
  
Wan Krishna is a 80 y.o. female  female for follow up of Right   Pressure ulcer to the Ischium  to the bone Ulcer has been present for 5 months. Patient was in Assisted living for 2 month from Oct to Dec/2020. Daughter got called in Dec that patient has pressure wounds, and they recommended she go on hospice. Daughter brought patient home. She will be taking care of her at home.  
  
Doing well, appetite much better. No major concerns. Changing position every 2 hours, has heel protector from hospital.  
Wound care going well.  Home health coming for wound vac change, currently only twice a week but that is causing lot of odor. Offloading wound: yes Appetite: fair Wound associated pain: none Diabetic: no 
Smoker:No 
 
 
Review of Systems: A comprehensive review of systems was negative except for that written in the History of Present Illness. Physical Exam:  
 
VS:  
Vitals:  
 05/19/21 7741 BP: (!) 145/70 Pulse: 79 Resp: 18 Temp: 97.5 °F (36.4 °C) General: well developed, well nourished, pleasant , NAD. Hygiene good Lower extremities: color normal; temperature normal. Hair growth is not present. Calves are supple, nontender, approximately equally sized in comparison. Capillary refill <3 sec Focused Lower Extremity Exam 
Vascular exam: 
Bilateral lower extremity: No  edema Pulse :Bilateral, DP, PT, 2+ Nails Dystrophic Wound Heel Right;Lateral #1 (Active) Wound Image   05/19/21 7998 Wound Etiology Pressure Unstageable 04/05/21 1203 Dressing Status New dressing applied 05/19/21 1014 Cleansed Cleansed with saline 03/29/21 0900 Dressing/Treatment Betadine swabs/Povidone Iodine 05/19/21 1014 Wound Length (cm) 0.1 cm 05/19/21 0926 Wound Width (cm) 0.1 cm 05/19/21 0926 Wound Depth (cm) 0.1 cm 05/19/21 0926 Wound Surface Area (cm^2) 0.01 cm^2 05/19/21 8825 Change in Wound Size % 99 05/19/21 0926 Wound Volume (cm^3) 0 cm^3 05/19/21 1809 Wound Healing % 100 05/19/21 0926 Post-Procedure Length (cm) 0.5 cm 04/28/21 1408 Post-Procedure Width (cm) 0.7 cm 04/28/21 1408 Post-Procedure Depth (cm) 0.3 cm 04/28/21 1408 Post-Procedure Surface Area (cm^2) 0.35 cm^2 04/28/21 1408 Post-Procedure Volume (cm^3) 0.1 cm^3 04/28/21 1408 Wound Assessment Eschar dry 05/19/21 1276 Drainage Amount None 05/19/21 0926 Wound Odor None 05/19/21 0926 Gely-Wound/Incision Assessment Dry/flaky 05/19/21 2090 Edges Flat/open edges 04/05/21 1914 Wound Thickness Description Full thickness 04/05/21 3446 Number of days: 51 Wound Ischial Right #2 (Active) Wound Image   05/19/21 0066 Wound Etiology Pressure Stage 4 04/05/21 3857 Dressing Status New dressing applied 05/19/21 1014 Cleansed Cleansed with saline 04/05/21 0927 Dressing/Treatment Other (Comment) 05/19/21 1014 Wound Length (cm) 1.7 cm 05/19/21 0926 Wound Width (cm) 0.8 cm 05/19/21 0926 Wound Depth (cm) 1.7 cm 05/19/21 0926 Wound Surface Area (cm^2) 1.36 cm^2 05/19/21 6259 Change in Wound Size % 66 05/19/21 0926 Wound Volume (cm^3) 2.31 cm^3 05/19/21 3959 Wound Healing % 71 05/19/21 0926 Post-Procedure Length (cm) 2 cm 04/14/21 1356 Post-Procedure Width (cm) 1.7 cm 04/14/21 1356 Post-Procedure Depth (cm) 2.4 cm 04/14/21 1356 Post-Procedure Surface Area (cm^2) 3.4 cm^2 04/14/21 1356 Post-Procedure Volume (cm^3) 8.16 cm^3 04/14/21 1356 Distance Tunneling (cm) 2.5 cm 04/05/21 8218 Direction of Tunnel 9 o'clock 04/05/21 0927 Undermining Starts ___ O'Clock 9 o'clock 05/19/21 0926 Undermining Ends ___ O'Clock 12 o'clock 05/19/21 0926 Undermining Maximum Distance (cm) 2.5 cm 05/19/21 0926 Wound Assessment Pink/red 05/19/21 0926 Drainage Amount Moderate 05/19/21 0926 Drainage Description Serosanguinous 05/19/21 5447 Wound Odor Mild 05/19/21 0926 Gely-Wound/Incision Assessment Blanchable erythema; Maceration 05/19/21 0926 Edges Flat/open edges 04/28/21 1334 Wound Thickness Description Full thickness 04/05/21 7173 Number of days: 51 Signed By: David Santizo MD   
 May 19, 2021

## 2021-05-19 NOTE — WOUND CARE
05/19/21 1014 Wound Heel Right;Lateral #1 Date First Assessed/Time First Assessed: 03/29/21 0908   Present on Hospital Admission: Yes  Primary Wound Type: Pressure Injury  Location: Heel  Wound Location Orientation: Right;Lateral  Wound Description: #1 Dressing Status New dressing applied Dressing/Treatment Betadine swabs/Povidone Iodine Wound Ischial Right #2 Date First Assessed/Time First Assessed: 03/29/21 0908   Present on Hospital Admission: Yes  Primary Wound Type: Pressure Injury  Location: Ischial  Wound Location Orientation: Right  Wound Description: #2 Dressing Status New dressing applied Dressing/Treatment Other (Comment) ([place], white foam, black foam, wound VAC) Discharge Condition: Stable Pain: 0 Ambulatory Status: Wheelchair Discharge Destination: Home Transportation: Car Accompanied by: Self  and Family/Caregiver Discharge instructions reviewed with Patient and Family/Caregiver  and copy or written instructions have been provided. All questions/concerns have been addressed at this time.

## 2021-05-19 NOTE — WOUND CARE
Negative Pressure NAME:  Corrina Coe YOB: 1927 MEDICAL RECORD NUMBER:  [id number] DATE:  5/19/2021  Applied Negative Pressure to  Right ischium wound(s)/ulcer(s).  [x] Applied skin barrier prep to mao-wound.  [x] Cut strips of plastic drape to picture frame wound so that mao-wound is     covered with the drape.  [x] If bridging dressing to less prominent site, cover any intact skin that will come in contact with the Negative Pressure Therapy sponge, gauze or channel drain with plastic drape. The sponge should never touch intact skin.  [x] Cut sponge, gauze or channel drain to size which will fit into the wound/ulcer bed without being forced.  [x] Be sure the sponge is large enough to hold the entire round plastic flange which is attached to the tubing. Never allow flange to be larger than the sponge or it will produce suction damaging intact skin.  Total number of individual pieces of foam used within the wound bed: 3 (2 black and 1 white)  [x] If bridging the dressing away from the primary site, be sure the bridge leads to a piece of sponge large enough to hold the entire flange without allowing any of the flange to overlap onto intact skin.  [x] Covered sponge, gauze or channel drain with plastic drape.  [x] Cut a hole in this plastic drape directly over the sponge the same size as the plastic drain tubing.  [x] Removed plastic liner from flange and apply it directly over the hole you cut.  [x] Removed the plastic cover from the flange.  [x] Attached the tubing to the wound/ulcer Negative Pressure Therapy and turn it on to be sure a vacuum is created and that there are no leaks.  [x] If air leaks occur, use plastic drape to patch them.  [x] Secured Negative Pressure Therapy dressing with ace wrap loosely if located on an extremity. Maintain tubing outside of ace wrap. Tubing must not exert pressure on intact skin.  
 
Response to treatment: Well tolerated by patient Applied per [employer] Guidelines Electronically signed by Richmond Villatoro RN on 5/19/2021 at 11:40 AM

## 2021-06-16 ENCOUNTER — HOSPITAL ENCOUNTER (OUTPATIENT)
Dept: WOUND CARE | Age: 86
Discharge: HOME | End: 2021-06-16
Payer: MEDICARE

## 2021-06-16 VITALS
DIASTOLIC BLOOD PRESSURE: 62 MMHG | HEART RATE: 62 BPM | SYSTOLIC BLOOD PRESSURE: 136 MMHG | RESPIRATION RATE: 18 BRPM | TEMPERATURE: 97.5 F

## 2021-06-16 PROCEDURE — 97605 NEG PRS WND THER DME<=50SQCM: CPT

## 2021-06-16 NOTE — PROGRESS NOTES
06/16/21 0923   Wound Heel Right;Lateral #1   Date First Assessed/Time First Assessed: 03/29/21 0908   Present on Hospital Admission: Yes  Primary Wound Type: Pressure Injury  Location: Heel  Wound Location Orientation: Right;Lateral  Wound Description: #1   Wound Image    Wound Length (cm) 0.1 cm   Wound Width (cm) 0.1 cm   Wound Depth (cm) 0.1 cm   Wound Surface Area (cm^2) 0.01 cm^2   Change in Wound Size % 99   Wound Volume (cm^3) 0 cm^3   Wound Healing % 100   Wound Assessment Eschar dry   Drainage Amount None   Wound Odor None   Gely-Wound/Incision Assessment Dry/flaky   Wound Ischial Right #2   Date First Assessed/Time First Assessed: 03/29/21 0908   Present on Hospital Admission: Yes  Primary Wound Type: Pressure Injury  Location: Ischial  Wound Location Orientation: Right  Wound Description: #2   Wound Image    Wound Length (cm) 1.5 cm   Wound Width (cm) 1.8 cm   Wound Depth (cm) 2 cm   Wound Surface Area (cm^2) 2.7 cm^2   Change in Wound Size % 32.5   Wound Volume (cm^3) 5.4 cm^3   Wound Healing % 32   Undermining Starts ___ O'Clock 12 o'clock   Undermining Ends ___ O'Clock 12 o'clock   Undermining Maximum Distance (cm) 2.4 cm   Wound Assessment Pink/red   Drainage Amount Moderate   Drainage Description Serosanguinous   Wound Odor Mild   Gely-Wound/Incision Assessment Maceration   Edges Flat/open edges        06/16/21 0923   Wound Heel Right;Lateral #1   Date First Assessed/Time First Assessed: 03/29/21 0908   Present on Hospital Admission: Yes  Primary Wound Type: Pressure Injury  Location: Heel  Wound Location Orientation: Right;Lateral  Wound Description: #1   Wound Image    Wound Length (cm) 0.1 cm   Wound Width (cm) 0.1 cm   Wound Depth (cm) 0.1 cm   Wound Surface Area (cm^2) 0.01 cm^2   Change in Wound Size % 99   Wound Volume (cm^3) 0 cm^3   Wound Healing % 100   Wound Assessment Eschar dry   Drainage Amount None   Wound Odor None   Gely-Wound/Incision Assessment Dry/flaky   Wound Ischial Right #2   Date First Assessed/Time First Assessed: 03/29/21 0908   Present on Hospital Admission: Yes  Primary Wound Type: Pressure Injury  Location: Ischial  Wound Location Orientation: Right  Wound Description: #2   Wound Image    Wound Length (cm) 1.5 cm   Wound Width (cm) 1.8 cm   Wound Depth (cm) 2 cm   Wound Surface Area (cm^2) 2.7 cm^2   Change in Wound Size % 32.5   Wound Volume (cm^3) 5.4 cm^3   Wound Healing % 32   Undermining Starts ___ O'Clock 12 o'clock   Undermining Ends ___ O'Clock 12 o'clock   Undermining Maximum Distance (cm) 2.4 cm   Wound Assessment Pink/red   Drainage Amount Moderate   Drainage Description Serosanguinous   Wound Odor Mild   Gely-Wound/Incision Assessment Maceration   Edges Flat/open edges     Visit Vitals  /62   Pulse 62   Temp 97.5 °F (36.4 °C)   Resp 18

## 2021-06-16 NOTE — WOUND CARE
06/16/21 1042 Wound Heel Right;Lateral #1 Date First Assessed/Time First Assessed: 03/29/21 0908   Present on Hospital Admission: Yes  Primary Wound Type: Pressure Injury  Location: Heel  Wound Location Orientation: Right;Lateral  Wound Description: #1 Dressing/Treatment Betadine swabs/Povidone Iodine Wound Ischial Right #2 Date First Assessed/Time First Assessed: 03/29/21 0908   Present on Hospital Admission: Yes  Primary Wound Type: Pressure Injury  Location: Ischial  Wound Location Orientation: Right  Wound Description: #2 Dressing/Treatment Negative Pressure Wound Therapy 
(white foam and black foam) Negative Pressure NAME:  Nate Machuca YOB: 1927 MEDICAL RECORD NUMBER:  [id number] DATE:  6/16/2021  Applied Negative Pressure to  Right ischium wound(s)/ulcer(s).  [x] Applied skin barrier prep to mao-wound.  [x] Cut strips of plastic drape to picture frame wound so that mao-wound is     covered with the drape.  [x] If bridging dressing to less prominent site, cover any intact skin that will come in contact with the Negative Pressure Therapy sponge, gauze or channel drain with plastic drape. The sponge should never touch intact skin.  [x] Cut sponge, gauze or channel drain to size which will fit into the wound/ulcer bed without being forced.  [x] Be sure the sponge is large enough to hold the entire round plastic flange which is attached to the tubing. Never allow flange to be larger than the sponge or it will produce suction damaging intact skin.  Total number of individual pieces of foam used within the wound bed:  1 white and 1 black  [x] If bridging the dressing away from the primary site, be sure the bridge leads to a piece of sponge large enough to hold the entire flange without allowing any of the flange to overlap onto intact skin.  [x] Covered sponge, gauze or channel drain with plastic drape.   
 [x] Cut a hole in this plastic drape directly over the sponge the same size as the plastic drain tubing.  [x] Removed plastic liner from flange and apply it directly over the hole you cut.  [x] Removed the plastic cover from the flange.  [x] Attached the tubing to the wound/ulcer Negative Pressure Therapy and turn it on to be sure a vacuum is created and that there are no leaks.  [x] If air leaks occur, use plastic drape to patch them.  [x] Secured Negative Pressure Therapy dressing with ace wrap loosely if located on an extremity. Maintain tubing outside of ace wrap. Tubing must not exert pressure on intact skin. Response to treatment: Well tolerated by patient Applied per [employer] Guidelines Discharge Condition: Stable Pain: 0 Ambulatory Status: Wheelchair Discharge Destination: Home Transportation: Car Accompanied by: Self  and Family/Caregiver Discharge instructions reviewed with Patient and Family/Caregiver  and copy or written instructions have been provided. All questions/concerns have been addressed at this time.   
 
  
 
  
 
 
 
Electronically signed by Zahida Saunders RN on 6/16/2021 at 10:43 AM

## 2021-06-16 NOTE — WOUND CARE
Wound Care Follow-up Assessment:  
 
80 y.o. 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  
 
Wound Location Right Ischium Cleanse with quarter strength Dakins and rinse well with saline. Apply collagen to wound bed, then wound vac. Pack with white foam to tunneling and undermining and black foam at 125 mmHg to the rest of the wound. Change every Monday, Wednesday, and Friday. Use border foam on sacrum, change as needed. Ok to use border foam dressing on contracted toes and right heel, change as needed. Pressure Relief: When sitting, shift position or do seat lifts every 15 minutes. Wheelchair cushion, specialty Bed/Mattress Turn every 2 hours when in bed. Avoid position directing pressure on wound site. Limit side lying to 30 degree tilt. Limit HOB elevation to 30 degrees. Dietary: 
Diet as tolerated, No Added Salt, increase Protein: protein drinks after meals Will begin process to order Grade 3 bed. Home Health ordered: yes How often: 3 times per week Patient and daughter understood and agrees with plan. Questions answered. Weekly visits and serial debridements also discussed. Follow up with me in 4 week. Subject:  
  
Azael Myers is a 80 y.o. female  female for follow up of Right   Pressure ulcer to the Ischium  to the bone Ulcer has been present for 5 months. Patient was in Assisted living for 2 month from Oct to Dec/2020. Daughter got called in Dec that patient has pressure wounds, and they recommended she go on hospice. Daughter brought patient home. She will be taking care of her at home.  
  
Doing well, appetite much better. No major concerns. Changing position every 2 hours, has heel protector from hospital.  
Wound care going well.  Home health coming for wound vac change, currently only twice a week but that is causing lot of odor. Offloading wound: yes Appetite: fair Wound associated pain: none Diabetic: no 
Smoker:No 
 
 
Review of Systems: A comprehensive review of systems was negative except for that written in the History of Present Illness. Physical Exam:  
 
VS:  
Vitals:  
 06/16/21 5385 BP: 136/62 Pulse: 62 Resp: 18 Temp: 97.5 °F (36.4 °C) General: well developed, well nourished, pleasant , NAD. Hygiene good Lower extremities: color normal; temperature normal. Hair growth is not present. Calves are supple, nontender, approximately equally sized in comparison. Capillary refill <3 sec Focused Lower Extremity Exam 
Vascular exam: 
Bilateral lower extremity: No  edema Pulse :Bilateral, DP, PT, 2+ Nails Dystrophic  
 
WOUND POA CONDITIONS Wound Heel Right;Lateral #1 (Active) Wound Image   06/16/21 1355 Wound Etiology Pressure Unstageable 04/05/21 3855 Dressing Status New dressing applied 05/19/21 1014 Cleansed Cleansed with saline 03/29/21 0900 Dressing/Treatment Betadine swabs/Povidone Iodine 06/16/21 1042 Wound Length (cm) 0.1 cm 06/16/21 3470 Wound Width (cm) 0.1 cm 06/16/21 0157 Wound Depth (cm) 0.1 cm 06/16/21 1630 Wound Surface Area (cm^2) 0.01 cm^2 06/16/21 3334 Change in Wound Size % 99 06/16/21 0923 Wound Volume (cm^3) 0 cm^3 06/16/21 6228 Wound Healing % 100 06/16/21 0923 Post-Procedure Length (cm) 0.5 cm 04/28/21 1408 Post-Procedure Width (cm) 0.7 cm 04/28/21 1408 Post-Procedure Depth (cm) 0.3 cm 04/28/21 1408 Post-Procedure Surface Area (cm^2) 0.35 cm^2 04/28/21 1408 Post-Procedure Volume (cm^3) 0.1 cm^3 04/28/21 1408 Wound Assessment Eschar dry 06/16/21 9143 Drainage Amount None 06/16/21 0923 Wound Odor None 06/16/21 5288 Gely-Wound/Incision Assessment Dry/flaky 06/16/21 4461 Edges Flat/open edges 04/05/21 6281 Wound Thickness Description Full thickness 04/05/21 7963 Number of days: 78 Wound Ischial Right #2 (Active) Wound Image   06/16/21 5541 Wound Etiology Pressure Stage 4 04/05/21 4990 Dressing Status New dressing applied 05/19/21 1014 Cleansed Cleansed with saline 04/05/21 0927 Dressing/Treatment Negative Pressure Wound Therapy 06/16/21 1042 Wound Length (cm) 1.5 cm 06/16/21 6995 Wound Width (cm) 1.8 cm 06/16/21 4855 Wound Depth (cm) 2 cm 06/16/21 3661 Wound Surface Area (cm^2) 2.7 cm^2 06/16/21 7968 Change in Wound Size % 32.5 06/16/21 0923 Wound Volume (cm^3) 5.4 cm^3 06/16/21 0034 Wound Healing % 32 06/16/21 0923 Post-Procedure Length (cm) 2 cm 04/14/21 1356 Post-Procedure Width (cm) 1.7 cm 04/14/21 1356 Post-Procedure Depth (cm) 2.4 cm 04/14/21 1356 Post-Procedure Surface Area (cm^2) 3.4 cm^2 04/14/21 1356 Post-Procedure Volume (cm^3) 8.16 cm^3 04/14/21 1356 Distance Tunneling (cm) 2.5 cm 04/05/21 6659 Direction of Tunnel 9 o'clock 04/05/21 0927 Undermining Starts ___ O'Clock 12 o'clock 06/16/21 5567 Undermining Ends ___ O'Clock 12 o'clock 06/16/21 0540 Undermining Maximum Distance (cm) 2.4 cm 06/16/21 5557 Wound Assessment Pink/red 06/16/21 6743 Drainage Amount Moderate 06/16/21 0923 Drainage Description Serosanguinous 06/16/21 6570 Wound Odor Mild 06/16/21 8547 Gely-Wound/Incision Assessment Maceration 06/16/21 1155 Edges Flat/open edges 06/16/21 4331 Wound Thickness Description Full thickness 04/05/21 2033 Number of days: 78 Signed By: Miesha Head MD   
 June 16, 2021

## 2021-06-16 NOTE — DISCHARGE INSTRUCTIONS
Discharge Instructions for  [address], [address]  Telephone: [phone]    NAME:  Analisa Mcrae  YOB: 1927  MEDICAL RECORD NUMBER:  [id number]  DATE:  6/16/2021    Dressings:           Wound Location Right Ishium   Cleanse with quarter strength dakins and rinse well with saline. Apply collagen to wound bed, then wound vac. Pack with white foam to tunneling and undermining and black foam at 125 mmHg to the rest of the wound. Change every Monday, Wednesday, and Friday. Use border foam on sacrum, change as needed. Ok to use border foam dressing on contracted toes and right heel, change as needed. Pressure Relief:  [x] When sitting, shift position or do seat lifts every 15 minutes. [x] Wheelchair cushion [x] Specialty Bed/Mattress  [x] Turn every 2 hours when in bed. Avoid position directing pressure on wound site. Limit side lying to 30 degree tilt. Limit HOB elevation to 30 degrees. Dietary:  [x] Diet as tolerated: [x] No Added Salt: [x] Increase Protein: protein drinks after meals             Return Appointment:  [x] ECF or Home Healthcare: [address] [place] [address]  [x] Return Appointment: With Dr. Siomara Carrillo  in  [address] [place] [address])       Electronically signed on 6/16/2021 at 10:15 AM     Stephanie Keller 281: Should you experience any significant changes in your wound(s) or have questions about your wound care, please contact the [address] [place] [address] at [address] [place] [address] 8:00 am - 4:30. If you need help with your wound outside these hours and cannot wait until we are again available, contact your PCP or go to the hospital emergency room. PLEASE NOTE: IF YOU ARE UNABLE TO OBTAIN WOUND SUPPLIES, CONTINUE TO USE THE SUPPLIES YOU HAVE AVAILABLE UNTIL YOU ARE ABLE TO REACH US. IT IS MOST IMPORTANT TO KEEP THE WOUND COVERED AT ALL TIMES.       Physician Signature:_______________________    Date: ___________ Time:  ____________

## 2021-06-23 ENCOUNTER — TELEPHONE (OUTPATIENT)
Dept: WOUND CARE | Age: 86
End: 2021-06-23

## 2021-06-23 NOTE — TELEPHONE ENCOUNTER
Received call from [address] [place] [address] who stated the ischial wound is too small to pack wound vac foam. RN requested to switch to betadine packing with ABD and change 3x per week. Gave verbal order to change wound care, will reassess patient at next visit.

## 2021-07-21 ENCOUNTER — HOSPITAL ENCOUNTER (OUTPATIENT)
Dept: WOUND CARE | Age: 86
Discharge: HOME | End: 2021-07-21

## 2021-07-21 VITALS
SYSTOLIC BLOOD PRESSURE: 153 MMHG | RESPIRATION RATE: 16 BRPM | TEMPERATURE: 97.5 F | DIASTOLIC BLOOD PRESSURE: 69 MMHG | HEART RATE: 68 BPM

## 2021-07-21 NOTE — WOUND CARE
07/21/21 1023   Wound Ischial Right #2   Date First Assessed/Time First Assessed: 03/29/21 0908   Present on Hospital Admission: Yes  Primary Wound Type: Pressure Injury  Location: Ischial  Wound Location Orientation: Right  Wound Description: #2   Wound Image    Wound Etiology Pressure Stage 4   Dressing Status Old drainage noted   Wound Length (cm) 0.9 cm   Wound Width (cm) 0.3 cm   Wound Depth (cm) 1 cm   Wound Surface Area (cm^2) 0.27 cm^2   Change in Wound Size % 93.25   Wound Volume (cm^3) 0.27 cm^3   Wound Healing % 97   Undermining Starts ___ O'Clock 12 o'clock   Undermining Ends ___ O'Clock 1 o'clock   Undermining Maximum Distance (cm) 2.1 cm   Wound Assessment Pink/red  (Unable to fully visualize wound bed)   Drainage Amount Moderate   Drainage Description Serosanguinous   Wound Odor None   Gely-Wound/Incision Assessment Maceration   Edges Unattached edges   Wound Thickness Description Full thickness     Visit Vitals  BP (!) 153/69 (BP 1 Location: Left upper arm, BP Patient Position: Semi fowlers)   Pulse 68   Temp 97.5 °F (36.4 °C)   Resp 16

## 2021-07-21 NOTE — DISCHARGE INSTRUCTIONS
Discharge Instructions for  [address], [address] [place] [address] Avenue  Telephone: [phone]    NAME:  Loreatha Castleman  YOB: 1927  MEDICAL RECORD NUMBER:  [id number]  DATE:  7/21/2021    Dressings:           Wound Location Right Ishium   Cleanse with quarter strength dakins and rinse well with saline. Apply betadine soaked gauze. Cover and secure. Change Monday, Wednesday, and Friday. Pressure Relief:  [x] When sitting, shift position or do seat lifts every 15 minutes. [x] Wheelchair cushion [x] Specialty Bed/Mattress  [x] Turn every 2 hours when in bed. Avoid position directing pressure on wound site. Limit side lying to 30 degree tilt. Limit HOB elevation to 30 degrees. Dietary:  [x] Diet as tolerated: [x] No Added Salt: [x] Increase Protein: protein drinks after meals             Return Appointment:  [x] ECF or Home Healthcare: [facility]  [x] Return Appointment: With Dr. Sangeeta Lopez  in  [address] [place] [address])       Electronically signed on 7/21/2021 at 10:54 [address]: Should you experience any significant changes in your wound(s) or have questions about your wound care, please contact the [facility] at [address] [place] [address] 8:00 am - 4:30. If you need help with your wound outside these hours and cannot wait until we are again available, contact your PCP or go to the hospital emergency room. PLEASE NOTE: IF YOU ARE UNABLE TO OBTAIN WOUND SUPPLIES, CONTINUE TO USE THE SUPPLIES YOU HAVE AVAILABLE UNTIL YOU ARE ABLE TO REACH US. IT IS MOST IMPORTANT TO KEEP THE WOUND COVERED AT ALL TIMES.       Physician Signature:_______________________    Date: ___________ Time:  ____________

## 2021-07-21 NOTE — WOUND CARE
Wound Care Follow-up      Assessment:     80 y.o. 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 Apply collagen to wound bed, pack with  betadine soaked packing. Cover and secure. Change every Monday, Wednesday, and Friday. Use border foam on sacrum, change as needed. Ok to use border foam dressing on contracted toes and right heel, change as needed. Pressure Relief:  When sitting, shift position or do seat lifts every 15 minutes. Wheelchair cushion, specialty Bed/Mattress  Turn every 2 hours when in bed. Avoid position directing pressure on wound site. Limit side lying to 30 degree tilt. Limit HOB elevation to 30 degrees. Dietary:  Diet as tolerated, No Added Salt, increase Protein: protein drinks after meals             Will begin process to order Grade 3 bed. Home Health ordered: yes  How often: 3 times per week     Patient and daughter understood and agrees with plan. Questions answered. Follow up with me in 4 week. Subject:      Swetha Mendoza is a 80 y.o. female  female for follow up of Right   Pressure ulcer to the Ischium  to the bone Ulcer has been present for 5 months. Patient was in Assisted living for 2 month from Oct to Dec/2020. Daughter got called in Dec that patient has pressure wounds, and they recommended she go on hospice. Daughter brought patient home. She will be taking care of her at home.      Doing well, appetite much better. No major concerns. Changing position every 2 hours during day time, has heel protector from hospital.   Wound care going well. Home health coming for wound care. Wound size is decreasing.   Offloading wound: yes  Appetite: fair  Wound associated pain: none  Diabetic: no  Smoker:No      Review of Systems:  A comprehensive review of systems was negative except for that written in the History of Present Illness. Physical Exam:     VS:   Vitals:    07/21/21 1015   BP: (!) 153/69   Pulse: 68   Resp: 16   Temp: 97.5 °F (36.4 °C)       General: well developed, well nourished, pleasant , NAD. Hygiene good  Lower extremities: color normal; temperature normal. Hair growth is not present. Calves are supple, nontender, approximately equally sized in comparison.  Capillary refill <3 sec    Focused Lower Extremity Exam  Vascular exam:  Bilateral lower extremity: No  edema   Pulse :Bilateral, DP, PT, 2+  Nails Dystrophic     WOUND POA CONDITIONS    Wound Ischial Right #2 (Active)   Wound Image   07/21/21 1023   Wound Etiology Pressure Stage 4 07/21/21 1023   Dressing Status Old drainage noted 07/21/21 1023   Cleansed Cleansed with saline 04/05/21 0927   Dressing/Treatment Negative Pressure Wound Therapy 06/16/21 1042   Wound Length (cm) 0.9 cm 07/21/21 1023   Wound Width (cm) 0.3 cm 07/21/21 1023   Wound Depth (cm) 1 cm 07/21/21 1023   Wound Surface Area (cm^2) 0.27 cm^2 07/21/21 1023   Change in Wound Size % 93.25 07/21/21 1023   Wound Volume (cm^3) 0.27 cm^3 07/21/21 1023   Wound Healing % 97 07/21/21 1023   Post-Procedure Length (cm) 2 cm 04/14/21 1356   Post-Procedure Width (cm) 1.7 cm 04/14/21 1356   Post-Procedure Depth (cm) 2.4 cm 04/14/21 1356   Post-Procedure Surface Area (cm^2) 3.4 cm^2 04/14/21 1356   Post-Procedure Volume (cm^3) 8.16 cm^3 04/14/21 1356   Distance Tunneling (cm) 2.5 cm 04/05/21 0927   Direction of Tunnel 9 o'clock 04/05/21 0927   Undermining Starts ___ O'Clock 12 o'clock 07/21/21 1023   Undermining Ends ___ O'Clock 1 o'clock 07/21/21 1023   Undermining Maximum Distance (cm) 2.1 cm 07/21/21 1023   Wound Assessment Pink/red 07/21/21 1023   Drainage Amount Moderate 07/21/21 1023   Drainage Description Serosanguinous 07/21/21 1023   Wound Odor None 07/21/21 1023 Gely-Wound/Incision Assessment Maceration 07/21/21 1023   Edges Unattached edges 07/21/21 1023   Wound Thickness Description Full thickness 07/21/21 1023   Number of days: 114       [REMOVED] Wound Heel Right;Lateral #1 (Removed)   Wound Image   06/16/21 0923   Wound Etiology Pressure Unstageable 04/05/21 0927   Dressing Status New dressing applied 05/19/21 1014   Cleansed Cleansed with saline 03/29/21 0900   Dressing/Treatment Betadine swabs/Povidone Iodine 06/16/21 1042   Wound Length (cm) 0.1 cm 06/16/21 0923   Wound Width (cm) 0.1 cm 06/16/21 0923   Wound Depth (cm) 0.1 cm 06/16/21 0923   Wound Surface Area (cm^2) 0.01 cm^2 06/16/21 0923   Change in Wound Size % 99 06/16/21 0923   Wound Volume (cm^3) 0 cm^3 06/16/21 0923   Wound Healing % 100 06/16/21 0923   Post-Procedure Length (cm) 0.5 cm 04/28/21 1408   Post-Procedure Width (cm) 0.7 cm 04/28/21 1408   Post-Procedure Depth (cm) 0.3 cm 04/28/21 1408   Post-Procedure Surface Area (cm^2) 0.35 cm^2 04/28/21 1408   Post-Procedure Volume (cm^3) 0.1 cm^3 04/28/21 1408   Wound Assessment Eschar dry 06/16/21 0923   Drainage Amount None 06/16/21 0923   Wound Odor None 06/16/21 0923   Gely-Wound/Incision Assessment Dry/flaky 06/16/21 0923   Edges Flat/open edges 04/05/21 0927   Wound Thickness Description Full thickness 04/05/21 7691   Number of days: 79           Signed By: Jackie Carranza MD     July 21, 2021

## 2021-08-18 ENCOUNTER — HOSPITAL ENCOUNTER (OUTPATIENT)
Dept: WOUND CARE | Age: 86
Discharge: HOME | End: 2021-08-18
Payer: MEDICARE

## 2021-08-18 VITALS
HEART RATE: 70 BPM | TEMPERATURE: 97.7 F | DIASTOLIC BLOOD PRESSURE: 77 MMHG | RESPIRATION RATE: 16 BRPM | SYSTOLIC BLOOD PRESSURE: 130 MMHG

## 2021-08-18 PROCEDURE — 99214 OFFICE O/P EST MOD 30 MIN: CPT

## 2021-08-18 NOTE — WOUND CARE
Visit Vitals  /77 (BP 1 Location: Left upper arm, BP Patient Position: Supine)   Pulse 70   Temp 97.7 °F (36.5 °C)   Resp 16        08/18/21 1018   Wound Ischial Right #2   Date First Assessed/Time First Assessed: 03/29/21 0908   Present on Hospital Admission: Yes  Primary Wound Type: Pressure Injury  Location: Ischial  Wound Location Orientation: Right  Wound Description: #2   Wound Image    Wound Etiology Pressure Stage 4   Cleansed Cleansed with saline   Wound Length (cm) 0.5 cm   Wound Width (cm) 0.3 cm   Wound Depth (cm) 1 cm   Wound Surface Area (cm^2) 0.15 cm^2   Change in Wound Size % 96.25   Wound Volume (cm^3) 0.15 cm^3   Wound Healing % 98   Wound Assessment Pink/red   Drainage Amount Small   Drainage Description Serosanguinous   Wound Odor None   Gely-Wound/Incision Assessment Maceration   Edges Unattached edges   Wound Thickness Description Full thickness

## 2021-08-18 NOTE — WOUND CARE
MSW left unidentifying voicemail for phone number given by Pt for Analy Ben (MICHELLE was signed by pt). Voicemail was automated and not personalized. MSW to follow up with Analy when she returns call.   Wound Care Follow-up      Assessment:     80 y.o. 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     Change outer cover as needed for compromise. Change every Monday, Wednesday, and Friday. Use border foam on sacrum, change as needed. Ok to use border foam dressing on contracted toes and right heel, change as needed. Pressure Relief:  When sitting, shift position or do seat lifts every 15 minutes. Wheelchair cushion, specialty Bed/Mattress  Turn every 2 hours when in bed. Avoid position directing pressure on wound site. Limit side lying to 30 degree tilt. Limit HOB elevation to 30 degrees. Dietary:  Diet as tolerated, No Added Salt, increase Protein: protein drinks after meals             Will begin process to order Grade 3 bed. Home Health ordered: yes  How often: 3 times per week     Patient and daughter understood and agrees with plan. Questions answered. Follow up with me in 4 week. Subject:      Swetha Mendoza is a 80 y.o. female  female for follow up of Right   Pressure ulcer to the Ischium  to the bone Ulcer has been present for few months. She will be taking care of her at home by her duaghter.      Doing well, appetite much better. No major concerns. Changing position every 2 hours during day time, has heel protector from hospital.   Wound care going well. Home health coming for wound care. Wound size is decreasing. Offloading wound: yes  Appetite: fair  Wound associated pain: none  Diabetic: no  Smoker:No      Review of Systems:  A comprehensive review of systems was negative except for that written in the History of Present Illness.     Physical Exam:     VS:   Vitals:    08/18/21 1017   BP: 130/77   Pulse: 70   Resp: 16   Temp: 97.7 °F (36.5 °C)       General: well developed, well nourished, pleasant , NAD. Hygiene good  Lower extremities: color normal; temperature normal. Hair growth is not present. Calves are supple, nontender, approximately equally sized in comparison.  Capillary refill <3 sec    Focused Lower Extremity Exam  Vascular exam:  Bilateral lower extremity: No  edema   Pulse :Bilateral, DP, PT, 2+  Nails Dystrophic       WOUND POA CONDITIONS    Wound Ischial Right #2 (Active)   Wound Image   08/18/21 1018   Wound Etiology Pressure Stage 4 08/18/21 1018   Dressing Status New dressing applied 07/21/21 1105   Cleansed Cleansed with saline 08/18/21 1018   Dressing/Treatment Collagen with Ag;Alginate with Ag 08/18/21 1040   Wound Length (cm) 0.5 cm 08/18/21 1018   Wound Width (cm) 0.3 cm 08/18/21 1018   Wound Depth (cm) 1 cm 08/18/21 1018   Wound Surface Area (cm^2) 0.15 cm^2 08/18/21 1018   Change in Wound Size % 96.25 08/18/21 1018   Wound Volume (cm^3) 0.15 cm^3 08/18/21 1018   Wound Healing % 98 08/18/21 1018   Post-Procedure Length (cm) 2 cm 04/14/21 1356   Post-Procedure Width (cm) 1.7 cm 04/14/21 1356   Post-Procedure Depth (cm) 2.4 cm 04/14/21 1356   Post-Procedure Surface Area (cm^2) 3.4 cm^2 04/14/21 1356   Post-Procedure Volume (cm^3) 8.16 cm^3 04/14/21 1356   Distance Tunneling (cm) 2.5 cm 04/05/21 0927   Direction of Tunnel 9 o'clock 04/05/21 0927   Undermining Starts ___ O'Clock 12 o'clock 07/21/21 1023   Undermining Ends ___ O'Clock 1 o'clock 07/21/21 1023   Undermining Maximum Distance (cm) 2.1 cm 07/21/21 1023   Wound Assessment Pink/red 08/18/21 1018   Drainage Amount Small 08/18/21 1018   Drainage Description Serosanguinous 08/18/21 1018   Wound Odor None 08/18/21 1018   Gely-Wound/Incision Assessment Maceration 08/18/21 1018   Edges Unattached edges 08/18/21 1018   Wound Thickness Description Full thickness 08/18/21 1018   Number of days: 142       [REMOVED] Wound Heel Right;Lateral #1 (Removed)   Wound Image   06/16/21 0923   Wound Etiology Pressure Unstageable 04/05/21 0927   Dressing Status New dressing applied 05/19/21 1014   Cleansed Cleansed with saline 03/29/21 0900   Dressing/Treatment Betadine swabs/Povidone Iodine 06/16/21 1042   Wound Length (cm) 0.1 cm 06/16/21 0923   Wound Width (cm) 0.1 cm 06/16/21 0923   Wound Depth (cm) 0.1 cm 06/16/21 0923   Wound Surface Area (cm^2) 0.01 cm^2 06/16/21 0923   Change in Wound Size % 99 06/16/21 0923   Wound Volume (cm^3) 0 cm^3 06/16/21 0923   Wound Healing % 100 06/16/21 0923   Post-Procedure Length (cm) 0.5 cm 04/28/21 1408   Post-Procedure Width (cm) 0.7 cm 04/28/21 1408   Post-Procedure Depth (cm) 0.3 cm 04/28/21 1408   Post-Procedure Surface Area (cm^2) 0.35 cm^2 04/28/21 1408   Post-Procedure Volume (cm^3) 0.1 cm^3 04/28/21 1408   Wound Assessment Eschar dry 06/16/21 0923   Drainage Amount None 06/16/21 0923   Wound Odor None 06/16/21 0923   Gely-Wound/Incision Assessment Dry/flaky 06/16/21 0923   Edges Flat/open edges 04/05/21 0927   Wound Thickness Description Full thickness 04/05/21 2226   Number of days: 79         Signed By: Josey Funk MD     August 18, 2021

## 2021-08-18 NOTE — WOUND CARE
08/18/21 1040   Wound Ischial Right #2   Date First Assessed/Time First Assessed: 03/29/21 0908   Present on Hospital Admission: Yes  Primary Wound Type: Pressure Injury  Location: Ischial  Wound Location Orientation: Right  Wound Description: #2   Dressing/Treatment Collagen with Ag;Alginate with Ag  (foam border dressing. )       Discharge Condition: Stable     Pain: 0    Ambulatory Status: Wheelchair     Discharge Destination: Home     Transportation: Car    Accompanied by: Self  and Family/Caregiver     Discharge instructions reviewed with Patient and Family/Caregiver  and copy or written instructions have been provided. All questions/concerns have been addressed at this time.

## 2021-08-18 NOTE — DISCHARGE INSTRUCTIONS
Discharge Instructions  [address], [address] [place] [address] Avenue  Telephone: [phone]  WOUND CARE ORDERS:  Wound Location Right Ishium :Cleanse with saline , apply collagen Ag  and aquacel Ag cover with border foam .   Pt./pcg/HH nurse to change (freq) 3 x week and as needed for compromise. F/U in 4 week. TREATMENT ORDERS:  Turn/reposition every 2 hours when in bed, avoid direct pressure on wound site. When sitting, shift position or do seat lifts every 15 minutes. Limit side lying to 30 degree tilt. Limit HOB elevation to 30 degrees. Use speciality pressure relief cushion, mattress as appropriate. Follow diet as prescribed:  [x] Diet as tolerated: [] Calorie Diabetic Diet:Low carb and no Sugar [] No Added Salt:[x] Increase Protein: [] Other:Limit the amount of liquid you are drinking and avoid drinking in between meals              Return Appointment:  [x] Return Appointment: With Dr Aydin Ibrahim in  [address] [place] [address])  [] Ordered tests:    Electronically signed Matthew Baca RN on 8/18/2021 at 10:32 AM     [facility] Information: Should you experience any significant changes in your wound(s) or have questions about your wound care, please contact the [facility] at [address] [place] [address] Street 8:00 am - 4:30. If you need help with your wound outside these hours and cannot wait until we are again available, contact your PCP or go to the hospital emergency room. PLEASE NOTE: IF YOU ARE UNABLE TO OBTAIN WOUND SUPPLIES, CONTINUE TO USE THE SUPPLIES YOU HAVE AVAILABLE UNTIL YOU ARE ABLE TO REACH US. IT IS MOST IMPORTANT TO KEEP THE WOUND COVERED AT ALL TIMES.      Physician Signature:_______________________    Date: ___________ Time:  ____________

## 2021-09-15 ENCOUNTER — HOSPITAL ENCOUNTER (OUTPATIENT)
Dept: WOUND CARE | Age: 86
Discharge: HOME | End: 2021-09-15
Payer: MEDICARE

## 2021-09-15 VITALS — TEMPERATURE: 97.9 F | DIASTOLIC BLOOD PRESSURE: 39 MMHG | HEART RATE: 79 BPM | SYSTOLIC BLOOD PRESSURE: 80 MMHG

## 2021-09-15 PROCEDURE — 99213 OFFICE O/P EST LOW 20 MIN: CPT

## 2021-09-15 NOTE — DISCHARGE INSTRUCTIONS
Discharge Instructions/Wound [address] [place] [address] [address]  [facility], [address]  Phone: [phone] Fax: [phone]    WOUND CARE ORDERS:  Right Ischium wound -  Cleanse with saline and pat dry. Apply collagen Ag to base of wound. Tuck aquacel Ag into wound and leave a tail so wound will close from inside out. Cover with border foam . Change three times a week and as needed due to drainage. TREATMENT ORDERS:  Turn/reposition every 2 hours when in bed, avoid direct pressure on wound site. When sitting, shift position or do seat lifts every 15 minutes. Limit side lying to 30 degree tilt. Limit HOB elevation to 30 degrees. Use speciality pressure relief cushion, mattress as appropriate. Follow diet as prescribed:  [] Diet as tolerated: [] Calorie Diabetic Diet:Low carb and no Sugar [] No Added Salt:[x] Increase Protein: [] Other:Limit the amount of liquid you are drinking and avoid drinking in between meals              Return Appointment:  [x] Return Appointment: With Dr. Trudi Shah in [address] [place] [address])  [] Ordered tests:    Electronically signed Rachel Morales RN on 9/15/2021 at [facility]: Should you experience any significant changes in your wound(s) or have questions about your wound care, please contact the [facility] at [address] [place] [address] 8:00 am - 4:30. If you need help with your wound outside these hours and cannot wait until we are again available, contact your PCP or go to the hospital emergency room. PLEASE NOTE: IF YOU ARE UNABLE TO OBTAIN WOUND SUPPLIES, CONTINUE TO USE THE SUPPLIES YOU HAVE AVAILABLE UNTIL YOU ARE ABLE TO REACH US. IT IS MOST IMPORTANT TO KEEP THE WOUND COVERED AT ALL TIMES.      Physician Signature:_______________________    Date: ___________ Time:  ____________

## 2021-09-15 NOTE — WOUND CARE
09/15/21 1102   Wound Ischial Right #2   Date First Assessed/Time First Assessed: 03/29/21 0908   Present on Hospital Admission: Yes  Primary Wound Type: Pressure Injury  Location: Ischial  Wound Location Orientation: Right  Wound Description: #2   Dressing Status New dressing applied   Dressing/Treatment Collagen with Ag;Alginate with Ag  (border foam)

## 2021-09-15 NOTE — WOUND CARE
Wound Care Follow-up      Assessment:     80 y.o. 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 Change every Monday, Wednesday, and Friday. Use border foam on sacrum, change as needed. Ok to use border foam dressing on contracted toes and right heel, change as needed. Pressure Relief:  When sitting, shift position or do seat lifts every 15 minutes. Wheelchair cushion, specialty Bed/Mattress  Turn every 2 hours when in bed. Avoid position directing pressure on wound site. Limit side lying to 30 degree tilt. Limit HOB elevation to 30 degrees. Dietary:  Diet as tolerated, No Added Salt, increase Protein: protein drinks after meals             Will begin process to order Grade 3 bed. Home Health ordered: yes  How often: 3 times per week     Patient and daughter understood and agrees with plan. Questions answered. Follow up with me in 4 week. Subject:      Sara Lima is a 80 y.o. female  female for follow up of Right   Pressure ulcer to the Ischium  to the bone Ulcer has been present for few months. She will be taking care of her at home by her daughter.      Doing well, appetite much better. No major concerns. Changing position every 2 hours during day time, has heel protector from hospital.   Wound care going well. Home health coming for wound care. Wound size is decreasing.   Offloading wound: yes  Appetite: fair  Wound associated pain: none  Diabetic: no  Smoker:No      Review of Systems:  A comprehensive review of systems was negative except for that written in the History of Present Illness. Physical Exam:     VS:   Vitals:    09/15/21 1036   BP: (!) 80/39   Pulse: 79   Temp: 97.9 °F (36.6 °C)       General: well developed, well nourished, pleasant , NAD. Hygiene good  Lower extremities: color normal; temperature normal. Hair growth is not present. Calves are supple, nontender, approximately equally sized in comparison.  Capillary refill <3 sec    Focused Lower Extremity Exam  Vascular exam:  Bilateral lower extremity: No  edema   Pulse :Bilateral, DP, PT, 2+  Nails Dystrophic     WOUND POA CONDITIONS    Wound Ischial Right #2 (Active)   Wound Image   08/18/21 1018   Wound Etiology Pressure Stage 4 08/18/21 1018   Dressing Status New dressing applied 09/15/21 1102   Cleansed Cleansed with saline 09/15/21 1039   Dressing/Treatment Collagen with Ag;Alginate with Ag 09/15/21 1102   Wound Length (cm) 0.3 cm 09/15/21 1039   Wound Width (cm) 0.3 cm 09/15/21 1039   Wound Depth (cm) 0.8 cm 09/15/21 1039   Wound Surface Area (cm^2) 0.09 cm^2 09/15/21 1039   Change in Wound Size % 97.75 09/15/21 1039   Wound Volume (cm^3) 0.072 cm^3 09/15/21 1039   Wound Healing % 99 09/15/21 1039   Post-Procedure Length (cm) 2 cm 04/14/21 1356   Post-Procedure Width (cm) 1.7 cm 04/14/21 1356   Post-Procedure Depth (cm) 2.4 cm 04/14/21 1356   Post-Procedure Surface Area (cm^2) 3.4 cm^2 04/14/21 1356   Post-Procedure Volume (cm^3) 8.16 cm^3 04/14/21 1356   Distance Tunneling (cm) 2.5 cm 04/05/21 0927   Direction of Tunnel 9 o'clock 04/05/21 0927   Undermining Starts ___ O'Clock 12 o'clock 07/21/21 1023   Undermining Ends ___ O'Clock 1 o'clock 07/21/21 1023   Undermining Maximum Distance (cm) 2.1 cm 07/21/21 1023   Wound Assessment Pink/red 08/18/21 1018   Drainage Amount Small 08/18/21 1018   Drainage Description Serosanguinous 08/18/21 1018   Wound Odor None 08/18/21 1018   Gely-Wound/Incision Assessment Maceration 08/18/21 1018   Edges Unattached edges 08/18/21 1018   Wound Thickness Description Full thickness 08/18/21 1018   Number of days: 170             Signed By: Aydin Ibrahim MD     September 15, 2021

## 2021-10-13 ENCOUNTER — HOSPITAL ENCOUNTER (OUTPATIENT)
Dept: WOUND CARE | Age: 86
Discharge: HOME | End: 2021-10-13
Payer: MEDICARE

## 2021-10-13 VITALS
RESPIRATION RATE: 17 BRPM | DIASTOLIC BLOOD PRESSURE: 72 MMHG | TEMPERATURE: 98 F | HEART RATE: 73 BPM | SYSTOLIC BLOOD PRESSURE: 133 MMHG

## 2021-10-13 PROCEDURE — 99213 OFFICE O/P EST LOW 20 MIN: CPT

## 2021-10-13 NOTE — WOUND CARE
Wound Care Follow-up      Assessment:     80 y.o. 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 Change every Monday, Wednesday, and Friday. Use border foam on sacrum, change as needed. Ok to use border foam dressing on contracted toes and right heel, change as needed. Pressure Relief:  When sitting, shift position or do seat lifts every 15 minutes. Wheelchair cushion, specialty Bed/Mattress  Turn every 2 hours when in bed. Avoid position directing pressure on wound site. Limit side lying to 30 degree tilt. Limit HOB elevation to 30 degrees. Dietary:  Diet as tolerated, No Added Salt, increase Protein: protein drinks after meals             Will begin process to order Grade 3 bed. Home Health ordered: yes  How often: 3 times per week     Patient and daughter understood and agrees with plan. Questions answered. Follow up with me in 4 week. Subject:      Sasha Cardona is a 80 y.o. female  female for follow up of Right   Pressure ulcer to the Ischium  to the bone Ulcer has been present for few months. She will be taking care of her at home by her daughter.      Doing well, appetite much better. No major concerns. Changing position every 2 hours during day time, has heel protector from hospital.   Wound care going well. Home health coming for wound care. Wound size is decreasing.   Offloading wound: yes  Appetite: fair  Wound associated pain: none  Diabetic: no  Smoker:No      Review of Systems:  A comprehensive review of systems was negative except for that written in the History of Present Illness. Physical Exam:     VS:   Vitals:    10/13/21 1034   BP: 133/72   Pulse: 73   Resp: 17   Temp: 98 °F (36.7 °C)       General: well developed, well nourished, pleasant , NAD. Hygiene good  Lower extremities: color normal; temperature normal. Hair growth is not present. Calves are supple, nontender, approximately equally sized in comparison. Capillary refill <3 sec    Focused Lower Extremity Exam  Vascular exam:  Bilateral lower extremity: No  edema   Pulse :Bilateral, DP, PT, 2+  Nails Dystrophic     WOUND POA CONDITIONS    Wound Ischial Right #2 (Active)   Wound Image   10/13/21 1034   Wound Etiology Pressure Stage 4 10/13/21 1034   Dressing Status New dressing applied 10/13/21 1134   Cleansed Cleansed with saline 10/13/21 1134   Dressing/Treatment Hydrofiber; Foam 10/13/21 1134   Wound Length (cm) 1 cm 10/13/21 1034   Wound Width (cm) 0.3 cm 10/13/21 1034   Wound Depth (cm) 1.5 cm 10/13/21 1034   Wound Surface Area (cm^2) 0.3 cm^2 10/13/21 1034   Change in Wound Size % 92.5 10/13/21 1034   Wound Volume (cm^3) 0.45 cm^3 10/13/21 1034   Wound Healing % 94 10/13/21 1034   Post-Procedure Length (cm) 2 cm 04/14/21 1356   Post-Procedure Width (cm) 1.7 cm 04/14/21 1356   Post-Procedure Depth (cm) 2.4 cm 04/14/21 1356   Post-Procedure Surface Area (cm^2) 3.4 cm^2 04/14/21 1356   Post-Procedure Volume (cm^3) 8.16 cm^3 04/14/21 1356   Distance Tunneling (cm) 2.5 cm 04/05/21 0927   Direction of Tunnel 9 o'clock 04/05/21 0927   Undermining Starts ___ O'Clock 12 o'clock 07/21/21 1023   Undermining Ends ___ O'Clock 1 o'clock 07/21/21 1023   Undermining Maximum Distance (cm) 2.1 cm 07/21/21 1023   Wound Assessment Pink/red 10/13/21 1034   Drainage Amount Small 10/13/21 1034   Drainage Description Serosanguinous 10/13/21 1034   Wound Odor None 10/13/21 1034   Gely-Wound/Incision Assessment Maceration 10/13/21 1034   Edges Attached edges; Unattached edges 10/13/21 1034   Wound Thickness Description Full thickness 10/13/21 1034   Number of days: 198       Signed By: Juan Diego Sanchez MD     October 13, 2021

## 2021-10-13 NOTE — DISCHARGE INSTRUCTIONS
Discharge Instructions/Wound [address] [place] [address] [address]  [facility], [address]  Phone: [phone] Fax: [phone]    WOUND CARE ORDERS:  Right Ischium wound -   Cleanse with saline and pat dry. Apply collagen Ag to base of wound. Tuck aquacel Ag into wound and leave a tail so wound will close from inside out. Cover with border foam . Change three times a week and as needed due to drainage. TREATMENT ORDERS:  Turn/reposition every 2 hours when in bed, avoid direct pressure on wound site. When sitting, shift position or do seat lifts every 15 minutes. Limit side lying to 30 degree tilt. Limit HOB elevation to 30 degrees. Use speciality pressure relief cushion, mattress as appropriate. Follow diet as prescribed:  [] Diet as tolerated: [] Calorie Diabetic Diet:Low carb and no Sugar [] No Added Salt:[x] Increase Protein: [] Other:Limit the amount of liquid you are drinking and avoid drinking in between meals              Return Appointment:  [x] Return Appointment: With Dr Kyle Madrigal in [address] [place] [address])  [] Ordered tests:    Electronically signed Veronica Live RN on 10/13/2021 at 11:20 MUKESH Harris Information: Should you experience any significant changes in your wound(s) or have questions about your wound care, please contact the [facility] at [address] [place] [address] 8:00 am - 4:30. If you need help with your wound outside these hours and cannot wait until we are again available, contact your PCP or go to the hospital emergency room. PLEASE NOTE: IF YOU ARE UNABLE TO OBTAIN WOUND SUPPLIES, CONTINUE TO USE THE SUPPLIES YOU HAVE AVAILABLE UNTIL YOU ARE ABLE TO REACH US. IT IS MOST IMPORTANT TO KEEP THE WOUND COVERED AT ALL TIMES.      Physician Signature:_______________________    Date: ___________ Time:  ____________

## 2021-10-13 NOTE — WOUND CARE
10/13/21 1034   Wound Ischial Right #2   Date First Assessed/Time First Assessed: 03/29/21 0908   Present on Hospital Admission: Yes  Primary Wound Type: Pressure Injury  Location: Ischial  Wound Location Orientation: Right  Wound Description: #2   Wound Image    Wound Etiology Pressure Stage 4   Dressing Status Intact; Old drainage noted   Cleansed Cleansed with saline   Wound Length (cm) 1 cm   Wound Width (cm) 0.3 cm   Wound Depth (cm) 1.5 cm   Wound Surface Area (cm^2) 0.3 cm^2   Change in Wound Size % 92.5   Wound Volume (cm^3) 0.45 cm^3   Wound Healing % 94   Wound Assessment Pink/red   Drainage Amount Small   Drainage Description Serosanguinous   Wound Odor None   Gely-Wound/Incision Assessment Maceration   Edges Attached edges; Unattached edges   Wound Thickness Description Full thickness   Pain 1   Pain Scale 1 Numeric (0 - 10)   Pain Intensity 1 0     Visit Vitals  /72 (BP 1 Location: Left upper arm, BP Patient Position: Lying)   Pulse 73   Temp 98 °F (36.7 °C)   Resp 17

## 2021-11-10 ENCOUNTER — HOSPITAL ENCOUNTER (OUTPATIENT)
Dept: WOUND CARE | Age: 86
Discharge: HOME | End: 2021-11-10
Payer: MEDICARE

## 2021-11-10 VITALS — HEART RATE: 76 BPM | RESPIRATION RATE: 16 BRPM | TEMPERATURE: 97.7 F

## 2021-11-10 PROCEDURE — 99214 OFFICE O/P EST MOD 30 MIN: CPT

## 2021-11-10 NOTE — WOUND CARE
Wound Care Follow-up      Assessment:     80 y.o. 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 Place hydrofera blue rope into wound and leave a tail so wound will close from inside out. Cover with border foam .   Change three times a week and as needed due to drainage. Change every Monday, Wednesday, and Friday. Use border foam on sacrum, change as needed. Ok to use border foam dressing on contracted toes and right heel, change as needed. Pressure Relief:  When sitting, shift position or do seat lifts every 15 minutes. Wheelchair cushion, specialty Bed/Mattress  Turn every 2 hours when in bed. Avoid position directing pressure on wound site. Limit side lying to 30 degree tilt. Limit HOB elevation to 30 degrees. Dietary:  Diet as tolerated, No Added Salt, increase Protein: protein drinks after meals         She has air mattress at home. Home Health ordered: yes  How often: 3 times per week     Patient and daughter understood and agrees with plan. Questions answered. Follow up with me in 4 week. Subject:      Stephen Elaine is a 80 y.o. female  female for follow up of Right   Pressure ulcer to the Ischium  to the bone Ulcer has been present for few months. She will be taking care of her at home by her daughter.      Doing well, appetite much better. No major concerns. Changing position every 2 hours during day time, has heel protector from hospital.   Wound care going well. Home health coming for wound care. Wound size is decreasing. Offloading wound: yes  Appetite: fair  Wound associated pain: none  Diabetic: no  Smoker:No      Review of Systems:  A comprehensive review of systems was negative except for that written in the History of Present Illness.     Physical Exam:     VS: Vitals:    11/10/21 1030   Pulse: 76   Resp: 16   Temp: 97.7 °F (36.5 °C)       General: well developed, well nourished, pleasant , NAD. Hygiene good  Lower extremities: color normal; temperature normal. Hair growth is not present. Calves are supple, nontender, approximately equally sized in comparison. Capillary refill <3 sec    Focused Lower Extremity Exam  Vascular exam:  Bilateral lower extremity: No  edema   Pulse :Bilateral, DP, PT, 2+  Nails Dystrophic     WOUND POA CONDITIONS    Wound Ischial Right #2 (Active)   Wound Image   11/10/21 1046   Wound Etiology Pressure Stage 4 11/10/21 1046   Dressing Status New dressing applied 11/10/21 1134   Cleansed Soap and water 11/10/21 1046   Dressing/Treatment Hydrofera Blue;  Other (Comment) 11/10/21 1134   Wound Length (cm) 1.3 cm 11/10/21 1046   Wound Width (cm) 0.3 cm 11/10/21 1046   Wound Depth (cm) 1 cm 11/10/21 1046   Wound Surface Area (cm^2) 0.39 cm^2 11/10/21 1046   Change in Wound Size % 90.25 11/10/21 1046   Wound Volume (cm^3) 0.39 cm^3 11/10/21 1046   Wound Healing % 95 11/10/21 1046   Post-Procedure Length (cm) 2 cm 04/14/21 1356   Post-Procedure Width (cm) 1.7 cm 04/14/21 1356   Post-Procedure Depth (cm) 2.4 cm 04/14/21 1356   Post-Procedure Surface Area (cm^2) 3.4 cm^2 04/14/21 1356   Post-Procedure Volume (cm^3) 8.16 cm^3 04/14/21 1356   Distance Tunneling (cm) 2.5 cm 04/05/21 0927   Direction of Tunnel 9 o'clock 04/05/21 0927   Undermining Starts ___ O'Clock 9 o'clock 11/10/21 1046   Undermining Ends ___ O'Clock 1 o'clock 11/10/21 1046   Undermining Maximum Distance (cm) 2 cm 11/10/21 1046   Wound Assessment Pink/red 11/10/21 1046   Drainage Amount Small 11/10/21 1046   Drainage Description Serosanguinous 11/10/21 1046   Wound Odor None 11/10/21 1046   Gely-Wound/Incision Assessment Maceration 11/10/21 1046   Edges Defined edges 11/10/21 1046   Wound Thickness Description Full thickness 11/10/21 1046   Number of days: 226              Signed By: Jerry Stephens MD     November 10, 2021

## 2021-11-10 NOTE — WOUND CARE
11/10/21 1134   Wound Ischial Right #2   Date First Assessed/Time First Assessed: 03/29/21 0908   Present on Hospital Admission: Yes  Primary Wound Type: Pressure Injury  Location: Ischial  Wound Location Orientation: Right  Wound Description: #2   Dressing Status New dressing applied   Dressing/Treatment Hydrofera Blue;  Other (Comment)  (bordered foam )

## 2021-11-10 NOTE — DISCHARGE INSTRUCTIONS
Discharge Instructions/Wound [facility]. [address]  [place], [address]  Phone: [phone] Fax: [phone]    WOUND CARE ORDERS:  Right Ischium wound -   Cleanse with saline and pat dry. Place hydrofera blue rope into wound and leave a tail so wound will close from inside out. Cover with border foam . Change three times a week and as needed due to drainage. TREATMENT ORDERS:  Turn/reposition every 2 hours when in bed, avoid direct pressure on wound site. When sitting, shift position or do seat lifts every 15 minutes. Limit side lying to 30 degree tilt. Limit HOB elevation to 30 degrees. Use speciality pressure relief cushion, mattress as appropriate. Follow diet as prescribed:  [x] Diet as tolerated: [] Calorie Diabetic Diet:Low carb and no Sugar [] No Added Salt:[x] Increase Protein: [] Other:Limit the amount of liquid you are drinking and avoid drinking in between meals              Return Appointment:  [x] Return Appointment: With Dr Augustus Lovell in [facility] ''R'' )  [] Ordered tests:    Electronically signed Shirlene Jarquin RN on 11/10/2021 at 11:07 AM     [facility] Information: Should you experience any significant changes in your wound(s) or have questions about your wound care, please contact the [address] [place] [address] at [address] [place] [address] 8:00 am - 4:30. If you need help with your wound outside these hours and cannot wait until we are again available, contact your PCP or go to the hospital emergency room. PLEASE NOTE: IF YOU ARE UNABLE TO OBTAIN WOUND SUPPLIES, CONTINUE TO USE THE SUPPLIES YOU HAVE AVAILABLE UNTIL YOU ARE ABLE TO REACH US. IT IS MOST IMPORTANT TO KEEP THE WOUND COVERED AT ALL TIMES.      Physician Signature:_______________________    Date: ___________ Time:  ____________

## 2021-11-10 NOTE — WOUND CARE
11/10/21 1046   Wound Ischial Right #2   Date First Assessed/Time First Assessed: 03/29/21 0908   Present on Hospital Admission: Yes  Primary Wound Type: Pressure Injury  Location: Ischial  Wound Location Orientation: Right  Wound Description: #2   Wound Image    Wound Etiology Pressure Stage 4   Cleansed Soap and water   Wound Length (cm) 1.3 cm   Wound Width (cm) 0.3 cm   Wound Depth (cm) 1 cm   Wound Surface Area (cm^2) 0.39 cm^2   Change in Wound Size % 90.25   Wound Volume (cm^3) 0.39 cm^3   Wound Healing % 95   Undermining Starts ___ O'Clock 9 o'clock   Undermining Ends ___ O'Clock 1 o'clock   Undermining Maximum Distance (cm) 2 cm   Wound Assessment Pink/red   Drainage Amount Small   Drainage Description Serosanguinous   Wound Odor None   Gely-Wound/Incision Assessment Maceration   Edges Defined edges   Wound Thickness Description Full thickness     Visit Vitals  Pulse 76   Temp 97.7 °F (36.5 °C)   Resp 16

## 2021-12-08 ENCOUNTER — HOSPITAL ENCOUNTER (OUTPATIENT)
Dept: WOUND CARE | Age: 86
Discharge: HOME | End: 2021-12-08
Payer: MEDICARE

## 2021-12-08 VITALS — HEART RATE: 76 BPM | TEMPERATURE: 97.9 F | SYSTOLIC BLOOD PRESSURE: 121 MMHG | DIASTOLIC BLOOD PRESSURE: 72 MMHG

## 2021-12-08 PROCEDURE — 99213 OFFICE O/P EST LOW 20 MIN: CPT

## 2021-12-08 NOTE — DISCHARGE INSTRUCTIONS
Discharge Instructions/Wound [address] [place] [address]. [address]  [place], [address]  Phone: [phone] Fax: [phone]    WOUND CARE ORDERS:  Right Ischium wound -   Cleanse with saline and pat dry. Tuck geoff into wound with Q-tip. Cover with border foam . Change three times a week and as needed due to drainage. TREATMENT ORDERS:  Turn/reposition every 2 hours when in bed, avoid direct pressure on wound site. When sitting, shift position or do seat lifts every 15 minutes. Limit side lying to 30 degree tilt. Limit HOB elevation to 30 degrees. Use speciality pressure relief cushion, mattress as appropriate. Follow diet as prescribed:  [x] Diet as tolerated: [] Calorie Diabetic Diet:Low carb and no Sugar [] No Added Salt:[x] Increase Protein: [] Other:Limit the amount of liquid you are drinking and avoid drinking in between meals              Return Appointment:  [x] Return Appointment: With Dr Kyle Madrigal in [address] [place] [address])  [] Ordered tests:    Electronically signed Veronica Live RN on 12/8/2021 at 10:30 AM     [facility] [address]: Should you experience any significant changes in your wound(s) or have questions about your wound care, please contact the [facility] at [address] [place] [address] Street 8:00 am - 4:30. If you need help with your wound outside these hours and cannot wait until we are again available, contact your PCP or go to the hospital emergency room. PLEASE NOTE: IF YOU ARE UNABLE TO OBTAIN WOUND SUPPLIES, CONTINUE TO USE THE SUPPLIES YOU HAVE AVAILABLE UNTIL YOU ARE ABLE TO REACH US. IT IS MOST IMPORTANT TO KEEP THE WOUND COVERED AT ALL TIMES.      Physician Signature:_______________________    Date: ___________ Time:  ____________

## 2021-12-08 NOTE — WOUND CARE
12/08/21 1019   Wound Ischial Right #2   Date First Assessed/Time First Assessed: 03/29/21 0908   Present on Hospital Admission: Yes  Primary Wound Type: Pressure Injury  Location: Ischial  Wound Location Orientation: Right  Wound Description: #2   Wound Assessment Pink/red   Drainage Amount Small   Drainage Description Serous   Wound Odor None   Gely-Wound/Incision Assessment Maceration   Edges Epibole (rolled edges)   Wound Thickness Description Full thickness   Pain 1   Pain Scale 1 Numeric (0 - 10)   Pain Intensity 1 0

## 2021-12-08 NOTE — WOUND CARE
Wound Care Follow-up      Assessment:     80 y.o. 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 Tuck geoff into wound with Q-tip, so wound will close from inside out. Cover with border foam .   Change three times a week and as needed due to drainage. Change every Monday, Wednesday, and Friday. Use border foam on sacrum, change as needed. Ok to use border foam dressing on contracted toes and right heel, change as needed. Pressure Relief:  When sitting, shift position or do seat lifts every 15 minutes. Wheelchair cushion, specialty Bed/Mattress  Turn every 2 hours when in bed. Avoid position directing pressure on wound site. Limit side lying to 30 degree tilt. Limit HOB elevation to 30 degrees. Dietary:  Diet as tolerated, No Added Salt, increase Protein: protein drinks after meals         She has air mattress at home. Home Health ordered: yes  How often: 3 times per week     Patient and daughter understood and agrees with plan. Questions answered. Follow up with me in 4 week. Subject:      Carol Yi is a 80 y.o. female  female for follow up of Right   Pressure ulcer to the Ischium  to the bone Ulcer has been present for few months. She will be taking care of her at home by her daughter.      Doing well, appetite much better. No major concerns. Changing position every 2 hours during day time, has heel protector from hospital.   Wound care going well. Home health coming for wound care. Wound size is decreasing. Offloading wound: yes  Appetite: fair  Wound associated pain: none  Diabetic: no  Smoker:No      Review of Systems:  A comprehensive review of systems was negative except for that written in the History of Present Illness.     Physical Exam:     VS:   Vitals: 12/08/21 1019   BP: 121/72   Pulse: 76   Temp: 97.9 °F (36.6 °C)       General: well developed, well nourished, pleasant , NAD. Hygiene good  Lower extremities: color normal; temperature normal. Hair growth is not present. Calves are supple, nontender, approximately equally sized in comparison. Capillary refill <3 sec    Focused Lower Extremity Exam  Vascular exam:  Bilateral lower extremity: No  edema   Pulse :Bilateral, DP, PT, 2+  Nails Dystrophic     WOUND POA CONDITIONS    Wound Ischial Right #2 (Active)   Wound Image   11/10/21 1046   Wound Etiology Pressure Stage 4 11/10/21 1046   Dressing Status New dressing applied 11/10/21 1134   Cleansed Soap and water 11/10/21 1046   Dressing/Treatment Collagen with Ag;  Foam 12/08/21 1057   Wound Length (cm) 0.3 cm 12/08/21 1057   Wound Width (cm) 0.9 cm 12/08/21 1057   Wound Depth (cm) 0.8 cm 12/08/21 1057   Wound Surface Area (cm^2) 0.27 cm^2 12/08/21 1057   Change in Wound Size % 93.25 12/08/21 1057   Wound Volume (cm^3) 0.216 cm^3 12/08/21 1057   Wound Healing % 97 12/08/21 1057   Post-Procedure Length (cm) 2 cm 04/14/21 1356   Post-Procedure Width (cm) 1.7 cm 04/14/21 1356   Post-Procedure Depth (cm) 2.4 cm 04/14/21 1356   Post-Procedure Surface Area (cm^2) 3.4 cm^2 04/14/21 1356   Post-Procedure Volume (cm^3) 8.16 cm^3 04/14/21 1356   Distance Tunneling (cm) 2.5 cm 04/05/21 0927   Direction of Tunnel 9 o'clock 04/05/21 0927   Undermining Starts ___ O'Clock 9 o'clock 11/10/21 1046   Undermining Ends ___ O'Clock 1 o'clock 11/10/21 1046   Undermining Maximum Distance (cm) 2 cm 11/10/21 1046   Wound Assessment Pink/red 12/08/21 1019   Drainage Amount Small 12/08/21 1019   Drainage Description Serous 12/08/21 1019   Wound Odor None 12/08/21 1019   Gely-Wound/Incision Assessment Maceration 12/08/21 1019   Edges Epibole (rolled edges) 12/08/21 1019   Wound Thickness Description Full thickness 12/08/21 1019   Number of days: 254         Signed By: Lizandro Hope MD December 8, 2021

## 2022-01-05 ENCOUNTER — HOSPITAL ENCOUNTER (OUTPATIENT)
Dept: WOUND CARE | Age: 87
Discharge: HOME | End: 2022-01-05
Payer: MEDICARE

## 2022-01-05 VITALS
SYSTOLIC BLOOD PRESSURE: 99 MMHG | DIASTOLIC BLOOD PRESSURE: 62 MMHG | RESPIRATION RATE: 16 BRPM | HEART RATE: 79 BPM | TEMPERATURE: 97.9 F

## 2022-01-05 PROCEDURE — 99213 OFFICE O/P EST LOW 20 MIN: CPT

## 2022-01-05 NOTE — DISCHARGE INSTRUCTIONS
Discharge Instructions/Wound [address] [facility]  [place], [address]  Phone: [phone] Fax: [phone]    WOUND CARE ORDERS:  Right Ischium wound -   Cleanse with saline and pat dry. Tuck geoff into wound bed and cover with border foam . Change three times a week and as needed due to drainage. TREATMENT ORDERS:  Turn/reposition every 2 hours when in bed, avoid direct pressure on wound site. When sitting, shift position or do seat lifts every 15 minutes. Limit side lying to 30 degree tilt. Limit HOB elevation to 30 degrees. Use speciality pressure relief cushion, mattress as appropriate. Follow diet as prescribed:  [] Diet as tolerated: [] Calorie Diabetic Diet:Low carb and no Sugar [] No Added Salt:[x] Increase Protein: [] Other:Limit the amount of liquid you are drinking and avoid drinking in between meals              Return Appointment:  [x] Return Appointment: With Dr Jorge Luis Covarrubias in [address] [place] [address]  [] Ordered tests:    Electronically signed Bharati Centeno RN on 1/5/2022 at 10:22 [address]: Should you experience any significant changes in your wound(s) or have questions about your wound care, please contact the [address] [place] [address] at [address] [place] [address] 8:00 am - 4:30. If you need help with your wound outside these hours and cannot wait until we are again available, contact your PCP or go to the hospital emergency room. PLEASE NOTE: IF YOU ARE UNABLE TO OBTAIN WOUND SUPPLIES, CONTINUE TO USE THE SUPPLIES YOU HAVE AVAILABLE UNTIL YOU ARE ABLE TO REACH US. IT IS MOST IMPORTANT TO KEEP THE WOUND COVERED AT ALL TIMES.      Physician Signature:_______________________    Date: ___________ Time:  ____________

## 2022-01-05 NOTE — WOUND CARE
Wound Care Follow-up      Assessment:     80 y.o. 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 Tuck geoff into wound with Q-tip, so wound will close from inside out. Cover with border foam .   Change three times a week and as needed due to drainage. Change every Monday, Wednesday, and Friday. Use border foam on sacrum, change as needed. Ok to use border foam dressing on contracted toes and right heel, change as needed. Pressure Relief:  When sitting, shift position or do seat lifts every 15 minutes. Wheelchair cushion, specialty Bed/Mattress  Turn every 2 hours when in bed. Avoid position directing pressure on wound site. Limit side lying to 30 degree tilt. Limit HOB elevation to 30 degrees. Dietary:  Diet as tolerated, No Added Salt, increase Protein: protein drinks after meals         She has air mattress at home. Home Health ordered: yes  How often: 3 times per week     Patient and daughter understood and agrees with plan. Questions answered. Follow up with me in 4 week. Subject:      Ally Denis is a 80 y.o. female  female for follow up of Right   Pressure ulcer to the Ischium  to the bone Ulcer has been present for few months. She will be taking care of her at home by her daughter.    Doing well, appetite much better. No major concerns. Changing position every 2 hours during day time, has heel protector from hospital.   Wound care going well. Home health coming for wound care. Wound size is decreasing. Offloading wound: yes  Appetite: fair  Wound associated pain: none  Diabetic: no  Smoker:No      Review of Systems:  A comprehensive review of systems was negative except for that written in the History of Present Illness.     Physical Exam:     VS:   Vitals: 01/05/22 1003   BP: 99/62   Pulse: 79   Resp: 16   Temp: 97.9 °F (36.6 °C)       General: well developed, well nourished, pleasant , NAD. Hygiene good  Lower extremities: color normal; temperature normal. Hair growth is not present. Calves are supple, nontender, approximately equally sized in comparison. Capillary refill <3 sec    Focused Lower Extremity Exam  Vascular exam:  Bilateral lower extremity: No  edema   Pulse :Bilateral, DP, PT, 2+  Nails Dystrophic     WOUND POA CONDITIONS    Wound Ischial Right #2 (Active)   Wound Image   01/05/22 1004   Wound Etiology Pressure Stage 4 01/05/22 1004   Dressing Status New dressing applied 11/10/21 1134   Cleansed Cleansed with saline 01/05/22 1004   Dressing/Treatment Collagen with Ag; Foam 12/08/21 1057   Wound Length (cm) 0.3 cm 01/05/22 1004   Wound Width (cm) 0.9 cm 01/05/22 1004   Wound Depth (cm) 0.6 cm 01/05/22 1004   Wound Surface Area (cm^2) 0.27 cm^2 01/05/22 1004   Change in Wound Size % 93.25 01/05/22 1004   Wound Volume (cm^3) 0.162 cm^3 01/05/22 1004   Wound Healing % 98 01/05/22 1004   Post-Procedure Length (cm) 2 cm 04/14/21 1356   Post-Procedure Width (cm) 1.7 cm 04/14/21 1356   Post-Procedure Depth (cm) 2.4 cm 04/14/21 1356   Post-Procedure Surface Area (cm^2) 3.4 cm^2 04/14/21 1356   Post-Procedure Volume (cm^3) 8.16 cm^3 04/14/21 1356   Distance Tunneling (cm) 2.5 cm 04/05/21 0927   Direction of Tunnel 9 o'clock 04/05/21 0927   Undermining Starts ___ O'Clock 9 o'clock 11/10/21 1046   Undermining Ends ___ O'Clock 1 o'clock 11/10/21 1046   Undermining Maximum Distance (cm) 2 cm 11/10/21 1046   Wound Assessment Pink/red;Slough 01/05/22 1004   Drainage Amount Small 01/05/22 1004   Drainage Description Serous 01/05/22 1004   Wound Odor None 01/05/22 1004   Gely-Wound/Incision Assessment Maceration 01/05/22 1004   Edges Epibole (rolled edges) 01/05/22 1004   Wound Thickness Description Full thickness 01/05/22 1004   Number of days: 282           Signed By: Jordin Lenz MD     January 5, 2022

## 2022-01-05 NOTE — WOUND CARE
01/05/22 1030   Wound Ischial Right #2   Date First Assessed/Time First Assessed: 03/29/21 0908   Present on Hospital Admission: Yes  Primary Wound Type: Pressure Injury  Location: Ischial  Wound Location Orientation: Right  Wound Description: #2   Dressing/Treatment   (geoff, border foam)

## 2022-01-05 NOTE — WOUND CARE
01/05/22 1004   Wound Ischial Right #2   Date First Assessed/Time First Assessed: 03/29/21 0908   Present on Hospital Admission: Yes  Primary Wound Type: Pressure Injury  Location: Ischial  Wound Location Orientation: Right  Wound Description: #2   Wound Image    Wound Etiology Pressure Stage 4   Cleansed Cleansed with saline   Wound Length (cm) 0.3 cm   Wound Width (cm) 0.9 cm   Wound Depth (cm) 0.6 cm   Wound Surface Area (cm^2) 0.27 cm^2   Change in Wound Size % 93.25   Wound Volume (cm^3) 0.162 cm^3   Wound Healing % 98   Wound Assessment [place]/red;Slough   Drainage Amount Small   Drainage Description Serous   Wound Odor None   Gely-Wound/Incision Assessment Maceration   Edges Epibole (rolled edges)   Wound Thickness Description Full thickness     Visit Vitals  BP 99/62 (BP 1 Location: Left upper arm, BP Patient Position: Lying)   Pulse 79   Temp 97.9 °F (36.6 °C)   Resp 16

## 2022-02-02 ENCOUNTER — HOSPITAL ENCOUNTER (OUTPATIENT)
Dept: WOUND CARE | Age: 87
Discharge: HOME | End: 2022-02-02
Payer: MEDICARE

## 2022-02-02 VITALS
RESPIRATION RATE: 18 BRPM | HEART RATE: 72 BPM | TEMPERATURE: 97.6 F | DIASTOLIC BLOOD PRESSURE: 71 MMHG | SYSTOLIC BLOOD PRESSURE: 144 MMHG

## 2022-02-02 PROCEDURE — 99213 OFFICE O/P EST LOW 20 MIN: CPT

## 2022-02-02 NOTE — WOUND CARE
02/02/22 1108   Wound Ischial Right #2   Date First Assessed/Time First Assessed: 03/29/21 0908   Present on Hospital Admission: Yes  Primary Wound Type: Pressure Injury  Location: Ischial  Wound Location Orientation: Right  Wound Description: #2   Dressing Status New dressing applied   Dressing/Treatment Collagen; Other (Comment)  (border foam)   Pt incontinent of urine. Depends and pants changed with help of daughter.

## 2022-02-02 NOTE — WOUND CARE
Wound Care Follow-up      Assessment:     80 y.o. 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 Tuck geoff into wound with Q-tip, so wound will close from inside out. Cover with border foam .   Change three times a week and as needed due to drainage. Change every Monday, Wednesday, and Friday. Use border foam on sacrum, change as needed. Ok to use border foam dressing on contracted toes and right heel, change as needed. Pressure Relief:  When sitting, shift position or do seat lifts every 15 minutes. Wheelchair cushion, specialty Bed/Mattress  Turn every 2 hours when in bed. Avoid position directing pressure on wound site. Limit side lying to 30 degree tilt. Limit HOB elevation to 30 degrees. Dietary:  Diet as tolerated, No Added Salt, increase Protein: protein drinks after meals         Patient and daughter understood and agrees with plan. Questions answered. Follow up with me in 4 week. Subject:      Ambrosio Hodges is a 80 y.o. female  female for follow up of Right   Pressure ulcer to the Ischium  to the bone Ulcer has been present for few months. She will be taking care of her at home by her daughter.    Doing well, appetite much better. No major concerns. Changing position every 2 hours during day time, has heel protector from hospital.   Wound care going well, now daughter does wound care. Wound size is very slow to decrease. Offloading wound: yes  Appetite: fair  Wound associated pain: none  Diabetic: no  Smoker:No      Review of Systems:  A comprehensive review of systems was negative except for that written in the History of Present Illness.     Physical Exam:     VS:   Vitals:    02/02/22 1013   BP: (!) 144/71   Pulse: 72   Resp: 18   Temp: 97.6 °F (36.4 °C) General: well developed, well nourished, pleasant , NAD. Hygiene good  Lower extremities: color normal; temperature normal. Hair growth is not present. Calves are supple, nontender, approximately equally sized in comparison. Capillary refill <3 sec    Focused Lower Extremity Exam  Vascular exam:  Bilateral lower extremity: No  edema   Pulse :Bilateral, DP, PT, 2+  Nails Dystrophic     WOUND POA CONDITIONS    Wound Ischial Right #2 (Active)   Wound Image   02/02/22 1020   Wound Etiology Pressure Stage 4 02/02/22 1020   Dressing Status New dressing applied 02/02/22 1108   Cleansed Irrigated with saline 02/02/22 1020   Dressing/Treatment Collagen; Other (Comment) 02/02/22 1108   Wound Length (cm) 0.2 cm 02/02/22 1020   Wound Width (cm) 0.8 cm 02/02/22 1020   Wound Depth (cm) 0.5 cm 02/02/22 1020   Wound Surface Area (cm^2) 0.16 cm^2 02/02/22 1020   Change in Wound Size % 96 02/02/22 1020   Wound Volume (cm^3) 0.08 cm^3 02/02/22 1020   Wound Healing % 99 02/02/22 1020   Post-Procedure Length (cm) 2 cm 04/14/21 1356   Post-Procedure Width (cm) 1.7 cm 04/14/21 1356   Post-Procedure Depth (cm) 2.4 cm 04/14/21 1356   Post-Procedure Surface Area (cm^2) 3.4 cm^2 04/14/21 1356   Post-Procedure Volume (cm^3) 8.16 cm^3 04/14/21 1356   Distance Tunneling (cm) 2.5 cm 04/05/21 0927   Direction of Tunnel 9 o'clock 04/05/21 0927   Undermining Starts ___ O'Clock 9 o'clock 11/10/21 1046   Undermining Ends ___ O'Clock 1 o'clock 11/10/21 1046   Undermining Maximum Distance (cm) 2 cm 11/10/21 1046   Wound Assessment Pink/red 02/02/22 1020   Drainage Amount Small 02/02/22 1020   Drainage Description Serosanguinous 02/02/22 1020   Wound Odor None 02/02/22 1020   Gely-Wound/Incision Assessment Maceration 02/02/22 1020   Edges Epibole (rolled edges) 02/02/22 1020   Wound Thickness Description Full thickness 02/02/22 1020   Number of days: 310               Signed By: Saravanan Mart MD     February 2, 2022

## 2022-02-02 NOTE — WOUND CARE
02/02/22 1020   Wound Ischial Right #2   Date First Assessed/Time First Assessed: 03/29/21 0908   Present on Hospital Admission: Yes  Primary Wound Type: Pressure Injury  Location: Ischial  Wound Location Orientation: Right  Wound Description: #2   Wound Image    Wound Etiology Pressure Stage 4   Dressing Status Intact; Old drainage noted   Cleansed Irrigated with saline   Wound Length (cm) 0.2 cm   Wound Width (cm) 0.8 cm   Wound Depth (cm) 0.5 cm   Wound Surface Area (cm^2) 0.16 cm^2   Change in Wound Size % 96   Wound Volume (cm^3) 0.08 cm^3   Wound Healing % 99   Wound Assessment Pink/red   Drainage Amount Small   Drainage Description Serosanguinous   Wound Odor None   Gely-Wound/Incision Assessment Maceration   Edges Epibole (rolled edges)   Wound Thickness Description Full thickness     Visit Vitals  BP (!) 144/71 (BP 1 Location: Right upper arm, BP Patient Position: Lying)   Pulse 72   Temp 97.6 °F (36.4 °C)   Resp 18

## 2022-02-02 NOTE — DISCHARGE INSTRUCTIONS
Discharge Instructions/Wound [address] [place] [address]. [address]  [place], [address]  Phone: [phone] Fax: [phone]    NAME:  Steve Fink  YOB: 1927  MEDICAL RECORD NUMBER:  [id number]  DATE:  2/2/2022  WOUND CARE ORDERS:  Right Ischium wound -   Cleanse with saline and pat dry. Tuck geoff into wound bed and cover with border foam . Change three times a week and as needed due to drainage. TREATMENT ORDERS:  Turn/reposition every 2 hours when in bed, avoid direct pressure on wound site. When sitting, shift position or do seat lifts every 15 minutes. Limit side lying to 30 degree tilt. Limit HOB elevation to 30 degrees. Use speciality pressure relief cushion, mattress as appropriate. Follow diet as prescribed:  [x] Diet as tolerated: [] Calorie Diabetic Diet:Low carb and no Sugar [] No Added Salt:[x] Increase Protein: [] Other:Limit the amount of liquid you are drinking and avoid drinking in between meals              Return Appointment:  [x] Return Appointment: With Dr Sean Jeans in [address] [place] [address])  [] Ordered tests:    Electronically signed Francis Dougherty RN on 2/2/2022 at 10:41 AM     [address] [facility] [address] Information: Should you experience any significant changes in your wound(s) or have questions about your wound care, please contact the [facility] Main at [address] [place] [address] 8:00 am - 4:30. If you need help with your wound outside these hours and cannot wait until we are again available, contact your PCP or go to the hospital emergency room. PLEASE NOTE: IF YOU ARE UNABLE TO OBTAIN WOUND SUPPLIES, CONTINUE TO USE THE SUPPLIES YOU HAVE AVAILABLE UNTIL YOU ARE ABLE TO REACH US. IT IS MOST IMPORTANT TO KEEP THE WOUND COVERED AT ALL TIMES.      Physician Signature:_______________________    Date: ___________ Time:  ____________

## 2022-02-08 NOTE — WOUND CARE
02/02/22 1020   Wound Ischial Right #2   Date First Assessed/Time First Assessed: 03/29/21 0908   Present on Hospital Admission: Yes  Primary Wound Type: Pressure Injury  Location: Ischial  Wound Location Orientation: Right  Wound Description: #2   Wound Image    Wound Etiology Pressure Stage 4   Dressing Status Intact; Old drainage noted   Cleansed Irrigated with saline   Wound Length (cm) 0.2 cm   Wound Width (cm) 0.8 cm   Wound Depth (cm) 0.5 cm   Wound Surface Area (cm^2) 0.16 cm^2   Change in Wound Size % 96   Wound Volume (cm^3) 0.08 cm^3   Wound Healing % 99   Wound Assessment Pink/red   Drainage Amount Moderate   Drainage Description Serosanguinous   Wound Odor None   Gely-Wound/Incision Assessment Maceration   Edges Epibole (rolled edges)   Wound Thickness Description Full thickness

## 2022-03-02 ENCOUNTER — HOSPITAL ENCOUNTER (OUTPATIENT)
Dept: WOUND CARE | Age: 87
Discharge: HOME | End: 2022-03-02
Payer: MEDICARE

## 2022-03-02 VITALS
RESPIRATION RATE: 16 BRPM | DIASTOLIC BLOOD PRESSURE: 71 MMHG | TEMPERATURE: 96.4 F | SYSTOLIC BLOOD PRESSURE: 126 MMHG | HEART RATE: 65 BPM

## 2022-03-02 PROCEDURE — 99213 OFFICE O/P EST LOW 20 MIN: CPT

## 2022-03-02 NOTE — DISCHARGE INSTRUCTIONS
Discharge Instructions/Wound [address] [facility]. [address]  [place], [address]  Phone: [phone] Fax: [phone]    NAME:  Bryce Funk  YOB: 1927  MEDICAL RECORD NUMBER:  [id number]  DATE:  3/2/2022  WOUND CARE ORDERS:  Right Ischium wound -   Cleanse with saline and pat dry. Tuck geoff into wound bed (leave tail) and cover with border foam . Change three times a week and as needed due to drainage. TREATMENT ORDERS:  Turn/reposition every 2 hours when in bed, avoid direct pressure on wound site. When sitting, shift position or do seat lifts every 15 minutes. Limit side lying to 30 degree tilt. Limit HOB elevation to 30 degrees. Use speciality pressure relief cushion, mattress as appropriate. Follow diet as prescribed:  [x] Diet as tolerated: [] Calorie Diabetic Diet:Low carb and no Sugar [] No Added Salt:[x] Increase Protein: [] Other:Limit the amount of liquid you are drinking and avoid drinking in between meals            Return Appointment:  [x] Return Appointment: With Dr Saravanan Mart in 6 weeks. [] Ordered tests:    Electronically signed Dave Rosario RN on 3/2/2022 at 10:36 AM     Stephanie Keller 281: Should you experience any significant changes in your wound(s) or have questions about your wound care, please contact the [address] [place] [address] at [address] [place] [address] 8:00 am - 4:30. If you need help with your wound outside these hours and cannot wait until we are again available, contact your PCP or go to the hospital emergency room. PLEASE NOTE: IF YOU ARE UNABLE TO OBTAIN WOUND SUPPLIES, CONTINUE TO USE THE SUPPLIES YOU HAVE AVAILABLE UNTIL YOU ARE ABLE TO REACH US. IT IS MOST IMPORTANT TO KEEP THE WOUND COVERED AT ALL TIMES.      Physician Signature:_______________________    Date: ___________ Time:  ____________

## 2022-03-02 NOTE — WOUND CARE
03/02/22 1018   Wound Ischial Right #2   Date First Assessed/Time First Assessed: 03/29/21 0908   Present on Hospital Admission: Yes  Primary Wound Type: Pressure Injury  Location: Ischial  Wound Location Orientation: Right  Wound Description: #2   Wound Image    Wound Etiology Pressure Stage 4   Dressing Status Intact; Old drainage noted  (removed border foam)   Cleansed Irrigated with saline   Wound Length (cm) 0.8 cm   Wound Width (cm) 0.2 cm   Wound Depth (cm) 1 cm   Wound Surface Area (cm^2) 0.16 cm^2   Change in Wound Size % 96   Wound Volume (cm^3) 0.16 cm^3   Wound Healing % 98   Wound Assessment Pink/red   Drainage Amount Small   Drainage Description Serosanguinous   Wound Odor None   Gely-Wound/Incision Assessment Maceration   Edges Flat/open edges   Wound Thickness Description Full thickness     Visit Vitals  /71 (BP 1 Location: Right upper arm, BP Patient Position: Lying)   Pulse 65   Temp (!) 96.4 °F (35.8 °C)   Resp 16

## 2022-03-02 NOTE — WOUND CARE
Wound Care Follow-up      Assessment:     80 y.o. 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 Tuck geoff into wound with Q-tip, so wound will close from inside out. Cover with border foam .   Change three times a week and as needed due to drainage. Change every Monday, Wednesday, and Friday. Use border foam on sacrum, change as needed. Ok to use border foam dressing on contracted toes and right heel, change as needed. Pressure Relief:  When sitting, shift position or do seat lifts every 15 minutes. Wheelchair cushion, specialty Bed/Mattress  Turn every 2 hours when in bed. Avoid position directing pressure on wound site. Limit side lying to 30 degree tilt. Limit HOB elevation to 30 degrees. Dietary:  Diet as tolerated, No Added Salt, increase Protein: protein drinks after meals         Patient and daughter understood and agrees with plan. Questions answered. Follow up with me in 6 week. Subject:      Steve Fink is a 80 y.o. female  female for follow up of Right   Pressure ulcer to the Ischium  to the bone Ulcer has been present for few months. She will be taking care of her at home by her daughter.    Doing well, appetite much better. No major concerns. Changing position every 2 hours during day time, has heel protector from hospital.   Wound care going well, now daughter does wound care. Wound size is very slow to decrease. Offloading wound: yes  Appetite: fair  Wound associated pain: none  Diabetic: no  Smoker:No      Review of Systems:  A comprehensive review of systems was negative except for that written in the History of Present Illness.     Physical Exam:     VS:   Vitals:    03/02/22 1023   BP: 126/71   Pulse: 65   Resp: 16   Temp: (!) 96.4 °F (35.8 °C) General: well developed, well nourished, pleasant , NAD. Hygiene good  Lower extremities: color normal; temperature normal. Hair growth is not present. Calves are supple, nontender, approximately equally sized in comparison. Capillary refill <3 sec    Focused Lower Extremity Exam  Vascular exam:  Bilateral lower extremity: No  edema   Pulse :Bilateral, DP, PT, 2+  Nails Dystrophic     WOUND POA CONDITIONS    Wound Ischial Right #2 (Active)   Wound Image   03/02/22 1018   Wound Etiology Pressure Stage 4 03/02/22 1018   Dressing Status Intact; Old drainage noted 03/02/22 1018   Cleansed Irrigated with saline 03/02/22 1018   Dressing/Treatment Collagen; Other (Comment) 02/02/22 1108   Wound Length (cm) 0.8 cm 03/02/22 1018   Wound Width (cm) 0.2 cm 03/02/22 1018   Wound Depth (cm) 1 cm 03/02/22 1018   Wound Surface Area (cm^2) 0.16 cm^2 03/02/22 1018   Change in Wound Size % 96 03/02/22 1018   Wound Volume (cm^3) 0.16 cm^3 03/02/22 1018   Wound Healing % 98 03/02/22 1018   Post-Procedure Length (cm) 2 cm 04/14/21 1356   Post-Procedure Width (cm) 1.7 cm 04/14/21 1356   Post-Procedure Depth (cm) 2.4 cm 04/14/21 1356   Post-Procedure Surface Area (cm^2) 3.4 cm^2 04/14/21 1356   Post-Procedure Volume (cm^3) 8.16 cm^3 04/14/21 1356   Distance Tunneling (cm) 2.5 cm 04/05/21 0927   Direction of Tunnel 9 o'clock 04/05/21 0927   Undermining Starts ___ O'Clock 9 o'clock 11/10/21 1046   Undermining Ends ___ O'Clock 1 o'clock 11/10/21 1046   Undermining Maximum Distance (cm) 2 cm 11/10/21 1046   Wound Assessment Pink/red 03/02/22 1018   Drainage Amount Small 03/02/22 1018   Drainage Description Serosanguinous 03/02/22 1018   Wound Odor None 03/02/22 1018   Gely-Wound/Incision Assessment Maceration 03/02/22 1018   Edges Flat/open edges 03/02/22 1018   Wound Thickness Description Full thickness 03/02/22 1018   Number of days: 338           Signed By: Pipe Fournier MD     March 2, 2022

## 2022-03-18 ENCOUNTER — TELEPHONE (OUTPATIENT)
Dept: WOUND CARE | Age: 87
End: 2022-03-18

## 2022-03-18 PROBLEM — R15.9 FECAL INCONTINENCE: Status: ACTIVE | Noted: 2021-03-29

## 2022-03-18 PROBLEM — L89.610 PRESSURE INJURY OF RIGHT HEEL, UNSTAGEABLE (HCC): Status: ACTIVE | Noted: 2021-03-29

## 2022-03-18 NOTE — TELEPHONE ENCOUNTER
Call received from patient's daughter, Lon Castleman. She reports pt has area of peeling skin on coccyx. States there is no redness and patient does not appear to be in pain. Lon Castleman instructed to apply zinc cream to area to keep dry and keep patient offloaded from coccyx/sacrum, turning every one - two hours. Patient has appointment 3/23/22.

## 2022-03-19 PROBLEM — G20 PARKINSON DISEASE (HCC): Status: ACTIVE | Noted: 2021-03-29

## 2022-03-19 PROBLEM — R63.4 WEIGHT LOSS: Status: ACTIVE | Noted: 2021-03-29

## 2022-03-19 PROBLEM — R32 URINARY INCONTINENCE: Status: ACTIVE | Noted: 2021-03-29

## 2022-03-19 PROBLEM — L89.314 PRESSURE INJURY OF RIGHT BUTTOCK, STAGE 4 (HCC): Status: ACTIVE | Noted: 2021-03-29

## 2022-03-23 ENCOUNTER — HOSPITAL ENCOUNTER (OUTPATIENT)
Dept: WOUND CARE | Age: 87
Discharge: HOME | End: 2022-03-23
Payer: MEDICARE

## 2022-03-23 DIAGNOSIS — R15.9 INCONTINENCE OF FECES, UNSPECIFIED FECAL INCONTINENCE TYPE: ICD-10-CM

## 2022-03-23 DIAGNOSIS — R63.4 WEIGHT LOSS: ICD-10-CM

## 2022-03-23 DIAGNOSIS — R32 URINARY INCONTINENCE, UNSPECIFIED TYPE: ICD-10-CM

## 2022-03-23 DIAGNOSIS — L89.314 PRESSURE INJURY OF RIGHT BUTTOCK, STAGE 4 (HCC): Primary | ICD-10-CM

## 2022-03-23 PROCEDURE — 99213 OFFICE O/P EST LOW 20 MIN: CPT

## 2022-03-23 NOTE — WOUND CARE
Wound Care Follow-up      Assessment:     80 y.o. 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 Tuck geoff into wound with Q-tip, so wound will close from inside out. Cover with border foam .   Change three times a week and as needed due to drainage. Change every Monday, Wednesday, and Friday. Use border foam on sacrum, change as needed. Ok to use border foam dressing on contracted toes and right heel, change as needed. Pressure Relief:  When sitting, shift position or do seat lifts every 15 minutes. Wheelchair cushion, specialty Bed/Mattress  Turn every 2 hours when in bed. Avoid position directing pressure on wound site. Limit side lying to 30 degree tilt. Limit HOB elevation to 30 degrees. Dietary:  Diet as tolerated, No Added Salt, increase Protein: protein drinks after meals         Patient and daughter understood and agrees with plan. Questions answered. Follow up with me in 4 week. Subject:      Emile Feliz is a 80 y.o. female  female for follow up of Right   Pressure ulcer to the Ischium  to the bone Ulcer has been present for few months. She will be taking care of her at home by her daughter.    Doing well, appetite much better. No major concerns. Changing position every 2 hours during day time, has heel protector from hospital.   Wound care going well, now daughter does wound care. Wound size is very slow to decrease. Offloading wound: yes  Appetite: fair  Wound associated pain: none  Diabetic: no  Smoker:No      Review of Systems:  A comprehensive review of systems was negative except for that written in the History of Present Illness. Physical Exam:     VS:   There were no vitals filed for this visit.   Reviewed with RN, matt, pending recording in [facility]    General: well developed, well nourished, pleasant , NAD. Hygiene good  Lower extremities: color normal; temperature normal. Hair growth is not present. Calves are supple, nontender, approximately equally sized in comparison. Capillary refill <3 sec    Focused Lower Extremity Exam  Vascular exam:  Bilateral lower extremity: No  edema   Pulse :Bilateral, DP, PT, 2+  Nails Dystrophic     WOUND POA CONDITIONS    Wound Ischial Right #2 (Active)   Wound Image   03/23/22 1015   Wound Etiology Pressure Stage 4 03/23/22 1015   Dressing Status Intact; Old drainage noted 03/02/22 1018   Cleansed Irrigated with saline 03/02/22 1018   Dressing/Treatment Collagen; Other (Comment) 02/02/22 1108   Wound Length (cm) 0.4 cm 03/23/22 1015   Wound Width (cm) 0.2 cm 03/23/22 1015   Wound Depth (cm) 0.5 cm 03/23/22 1015   Wound Surface Area (cm^2) 0.08 cm^2 03/23/22 1015   Change in Wound Size % 98 03/23/22 1015   Wound Volume (cm^3) 0.04 cm^3 03/23/22 1015   Wound Healing % 100 03/23/22 1015   Post-Procedure Length (cm) 2 cm 04/14/21 1356   Post-Procedure Width (cm) 1.7 cm 04/14/21 1356   Post-Procedure Depth (cm) 2.4 cm 04/14/21 1356   Post-Procedure Surface Area (cm^2) 3.4 cm^2 04/14/21 1356   Post-Procedure Volume (cm^3) 8.16 cm^3 04/14/21 1356   Distance Tunneling (cm) 2.5 cm 04/05/21 0927   Direction of Tunnel 9 o'clock 04/05/21 0927   Undermining Starts ___ O'Clock 9 o'clock 11/10/21 1046   Undermining Ends ___ O'Clock 1 o'clock 11/10/21 1046   Undermining Maximum Distance (cm) 2 cm 11/10/21 1046   Wound Assessment Pink/red 03/23/22 1015   Drainage Amount Small 03/23/22 1015   Drainage Description Serosanguinous 03/23/22 1015   Wound Odor None 03/23/22 1015   Gely-Wound/Incision Assessment Maceration 03/23/22 1015   Edges Flat/open edges 03/23/22 1015   Wound Thickness Description Full thickness 03/23/22 1015   Number of days: 359             Signed By: Efraín Banegas MD     March 23, 2022

## 2022-03-23 NOTE — WOUND CARE
03/23/22 1015   Wound Ischial Right #2   Date First Assessed/Time First Assessed: 03/29/21 0908   Present on Hospital Admission: Yes  Primary Wound Type: Pressure Injury  Location: Ischial  Wound Location Orientation: Right  Wound Description: #2   Wound Image    Wound Etiology Pressure Stage 4   Wound Length (cm) 0.4 cm   Wound Width (cm) 0.2 cm   Wound Depth (cm) 0.5 cm   Wound Surface Area (cm^2) 0.08 cm^2   Change in Wound Size % 98   Wound Volume (cm^3) 0.04 cm^3   Wound Healing % 100   Wound Assessment Pink/red   Drainage Amount Small   Drainage Description Serosanguinous   Wound Odor None   Gely-Wound/Incision Assessment Maceration   Edges Flat/open edges   Wound Thickness Description Full thickness   There were no vitals taken for this visit.

## 2022-03-23 NOTE — DISCHARGE INSTRUCTIONS
Discharge Instructions/Wound [address] [facility]. [address]  [place], [address]  Phone: [phone] Fax: [phone]    NAME:  Israel Emerson  YOB: 1927  MEDICAL RECORD NUMBER:  [id number]  DATE:  3/23/2022  WOUND CARE ORDERS:  Right Ischium wound -   Cleanse with saline and pat dry. Place zinc or vaseline to redness at the sacral area. Tuck geoff into wound bed and cover with border foam. Change three times a week and as needed due to drainage. TREATMENT ORDERS:  Turn/reposition every 2 hours when in bed, avoid direct pressure on wound site. When sitting, shift position or do seat lifts every 15 minutes. Limit side lying to 30 degree tilt. Limit HOB elevation to 30 degrees. Use speciality pressure relief cushion, mattress as appropriate. Follow diet as prescribed:  [x] Diet as tolerated: [] Calorie Diabetic Diet:Low carb and no Sugar [] No Added Salt:[] Increase Protein: [] Other:Limit the amount of liquid you are drinking and avoid drinking in between meals            Return Appointment:  [x] Return Appointment: With Dr Oswaldo Baker in 4 weeks. [] Ordered tests:    Electronically signed Emma Jones RN on 3/23/2022 at 10:58 AM     Stephanie Keller 281: Should you experience any significant changes in your wound(s) or have questions about your wound care, please contact the [facility] Main at [address] [place] [address] Street 8:00 am - 4:30. If you need help with your wound outside these hours and cannot wait until we are again available, contact your PCP or go to the hospital emergency room. PLEASE NOTE: IF YOU ARE UNABLE TO OBTAIN WOUND SUPPLIES, CONTINUE TO USE THE SUPPLIES YOU HAVE AVAILABLE UNTIL YOU ARE ABLE TO REACH US. IT IS MOST IMPORTANT TO KEEP THE WOUND COVERED AT ALL TIMES.      Physician Signature:_______________________    Date: ___________ Time:  ____________

## 2022-03-23 NOTE — WOUND CARE
03/23/22 1112   Wound Ischial Right #2   Date First Assessed/Time First Assessed: 03/29/21 0908   Present on Hospital Admission: Yes  Primary Wound Type: Pressure Injury  Location: Ischial  Wound Location Orientation: Right  Wound Description: #2   Dressing/Treatment   (geoff, border foam)

## 2022-04-20 ENCOUNTER — HOSPITAL ENCOUNTER (OUTPATIENT)
Dept: WOUND CARE | Age: 87
Discharge: HOME | End: 2022-04-20
Payer: MEDICARE

## 2022-04-20 VITALS — HEART RATE: 71 BPM | RESPIRATION RATE: 15 BRPM | TEMPERATURE: 96.7 F

## 2022-04-20 DIAGNOSIS — R63.4 WEIGHT LOSS: ICD-10-CM

## 2022-04-20 DIAGNOSIS — R32 URINARY INCONTINENCE, UNSPECIFIED TYPE: ICD-10-CM

## 2022-04-20 DIAGNOSIS — L89.314 PRESSURE INJURY OF RIGHT BUTTOCK, STAGE 4 (HCC): Primary | ICD-10-CM

## 2022-04-20 DIAGNOSIS — R15.9 INCONTINENCE OF FECES, UNSPECIFIED FECAL INCONTINENCE TYPE: ICD-10-CM

## 2022-04-20 PROCEDURE — 99213 OFFICE O/P EST LOW 20 MIN: CPT

## 2022-04-20 NOTE — WOUND CARE
04/20/22 1021   Wound Ischial Right #2   Date First Assessed/Time First Assessed: 03/29/21 0908   Present on Hospital Admission: Yes  Primary Wound Type: Pressure Injury  Location: Ischial  Wound Location Orientation: Right  Wound Description: #2   Wound Image    Wound Etiology Pressure Stage 4   Dressing Status Intact   Cleansed Irrigated with saline   Wound Length (cm) 0.4 cm   Wound Width (cm) 0.2 cm   Wound Depth (cm) 0.3 cm   Wound Surface Area (cm^2) 0.08 cm^2   Change in Wound Size % 98   Wound Volume (cm^3) 0.024 cm^3   Wound Healing % 100   Wound Assessment Pink/red   Drainage Amount Small   Drainage Description Serosanguinous   Wound Odor None   Gely-Wound/Incision Assessment Maceration   Edges Flat/open edges   Wound Thickness Description Full thickness     Visit Vitals  Pulse 71   Temp (!) 96.7 °F (35.9 °C)   Resp 15

## 2022-04-20 NOTE — DISCHARGE INSTRUCTIONS
Discharge Instructions/Wound [id number]  [place], [address]  Phone: [phone] Fax: [phone]    NAME:  Eusebia Glasgow  YOB: 1927  MEDICAL RECORD NUMBER:  [id number]  DATE:  4/20/2022  WOUND CARE ORDERS:  Right Ischium wound -   Cleanse with saline and pat dry. Place zinc or vaseline to redness at the sacral area. Cover with silicone border foam. Change three times a week and as needed due to drainage until completely closed. TREATMENT ORDERS:  Turn/reposition every 2 hours when in bed, avoid direct pressure on wound site. When sitting, shift position or do seat lifts every 15 minutes. Limit side lying to 30 degree tilt. Limit HOB elevation to 30 degrees. Use speciality pressure relief cushion, mattress as appropriate. Follow diet as prescribed:  [x] Diet as tolerated: [] Calorie Diabetic Diet:Low carb and no Sugar [] No Added Salt:[x] Increase Protein: [] Other:Limit the amount of liquid you are drinking and avoid drinking in between meals            Return Appointment:  [x] Return Appointment: With Dr Justin Ponce as needed. [] Ordered tests:    Electronically signed Leeanna Gifford RN on 4/20/2022 at 10:37 AM     Stephanie Keller 281: Should you experience any significant changes in your wound(s) or have questions about your wound care, please contact the [facility] at [address] [place] [address] 8:00 am - 4:30. If you need help with your wound outside these hours and cannot wait until we are again available, contact your PCP or go to the hospital emergency room. PLEASE NOTE: IF YOU ARE UNABLE TO OBTAIN WOUND SUPPLIES, CONTINUE TO USE THE SUPPLIES YOU HAVE AVAILABLE UNTIL YOU ARE ABLE TO REACH US. IT IS MOST IMPORTANT TO KEEP THE WOUND COVERED AT ALL TIMES.      Physician Signature:_______________________    Date: ___________ Time:  ____________

## 2022-04-20 NOTE — WOUND CARE
Wound Care Follow-up      Assessment:     80 y.o. 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 Ok to use border foam dressing on contracted toes and right heel, change as needed. Pressure Relief:  When sitting, shift position or do seat lifts every 15 minutes. Wheelchair cushion, specialty Bed/Mattress  Turn every 2 hours when in bed. Avoid position directing pressure on wound site. Limit side lying to 30 degree tilt. Limit HOB elevation to 30 degrees. Dietary:  Diet as tolerated, No Added Salt, increase Protein: protein drinks after meals         Patient and daughter understood and agrees with plan. Questions answered. Patient discharged in stable condition. Subject:      Ismael Harman is a 80 y.o. female  female for follow up of Right   Pressure ulcer to the Ischium  to the bone Ulcer has been present for few months. She will be taking care of her at home by her daughter.    Doing well, appetite much better. No major concerns. Changing position every 2 hours during day time, has heel protector from hospital.   Wound care going well, now daughter does wound care. Wound is almost healed now. Offloading wound: yes  Appetite: fair  Wound associated pain: none  Diabetic: no  Smoker:No      Review of Systems:  A comprehensive review of systems was negative except for that written in the History of Present Illness. Physical Exam:     VS:   Vitals:    04/20/22 1017   Pulse: 71   Resp: 15   Temp: (!) 96.7 °F (35.9 °C)     Reviewed with RN, matt, pending recording in EMAR    General: well developed, well nourished, pleasant , NAD. Hygiene good  Lower extremities: color normal; temperature normal. Hair growth is not present.  Calves are supple, nontender, approximately equally sized in comparison. Capillary refill <3 sec    Focused Lower Extremity Exam  Vascular exam:  Bilateral lower extremity: No  edema   Pulse :Bilateral, DP, PT, 2+  Nails Dystrophic     WOUND POA CONDITIONS    [REMOVED] Wound Heel Right;Lateral #1 (Removed)   Wound Image   06/16/21 0923   Wound Etiology Pressure Unstageable 04/05/21 0927   Dressing Status New dressing applied 05/19/21 1014   Cleansed Cleansed with saline 03/29/21 0900   Dressing/Treatment Betadine swabs/Povidone Iodine 06/16/21 1042   Wound Length (cm) 0.1 cm 06/16/21 0923   Wound Width (cm) 0.1 cm 06/16/21 0923   Wound Depth (cm) 0.1 cm 06/16/21 0923   Wound Surface Area (cm^2) 0.01 cm^2 06/16/21 0923   Change in Wound Size % 99 06/16/21 0923   Wound Volume (cm^3) 0 cm^3 06/16/21 0923   Wound Healing % 100 06/16/21 0923   Post-Procedure Length (cm) 0.5 cm 04/28/21 1408   Post-Procedure Width (cm) 0.7 cm 04/28/21 1408   Post-Procedure Depth (cm) 0.3 cm 04/28/21 1408   Post-Procedure Surface Area (cm^2) 0.35 cm^2 04/28/21 1408   Post-Procedure Volume (cm^3) 0.1 cm^3 04/28/21 1408   Wound Assessment Eschar dry 06/16/21 0923   Drainage Amount None 06/16/21 0923   Wound Odor None 06/16/21 0923   Gely-Wound/Incision Assessment Dry/flaky 06/16/21 0923   Edges Flat/open edges 04/05/21 0927   Wound Thickness Description Full thickness 04/05/21 0927   Number of days: 79       [REMOVED] Wound Ischial Right #2 (Removed)   Wound Image   04/20/22 1021   Wound Etiology Pressure Stage 4 04/20/22 1021   Dressing Status Intact 04/20/22 1021   Cleansed Irrigated with saline 04/20/22 1021   Dressing/Treatment Collagen; Other (Comment) 02/02/22 1108   Wound Length (cm) 0.4 cm 04/20/22 1021   Wound Width (cm) 0.2 cm 04/20/22 1021   Wound Depth (cm) 0.3 cm 04/20/22 1021   Wound Surface Area (cm^2) 0.08 cm^2 04/20/22 1021   Change in Wound Size % 98 04/20/22 1021   Wound Volume (cm^3) 0.024 cm^3 04/20/22 1021   Wound Healing % 100 04/20/22 1021   Post-Procedure Length (cm) 2 cm 04/14/21 1356   Post-Procedure Width (cm) 1.7 cm 04/14/21 1356   Post-Procedure Depth (cm) 2.4 cm 04/14/21 1356   Post-Procedure Surface Area (cm^2) 3.4 cm^2 04/14/21 1356   Post-Procedure Volume (cm^3) 8.16 cm^3 04/14/21 1356   Distance Tunneling (cm) 2.5 cm 04/05/21 0927   Direction of Tunnel 9 o'clock 04/05/21 0927   Undermining Starts ___ O'Clock 9 o'clock 11/10/21 1046   Undermining Ends ___ O'Clock 1 o'clock 11/10/21 1046   Undermining Maximum Distance (cm) 2 cm 11/10/21 1046   Wound Assessment Pink/red 04/20/22 1021   Drainage Amount Small 04/20/22 1021   Drainage Description Serosanguinous 04/20/22 1021   Wound Odor None 04/20/22 1021   Gely-Wound/Incision Assessment Maceration 04/20/22 1021   Edges Flat/open edges 04/20/22 1021   Wound Thickness Description Full thickness 04/20/22 1021   Number of days: 387           Signed By: Almedia Prader, MD     April 20, 2022

## 2023-06-28 ENCOUNTER — HOSPITAL ENCOUNTER (OUTPATIENT)
Facility: HOSPITAL | Age: 88
Discharge: HOME | End: 2023-06-28
Payer: MEDICARE

## 2023-06-28 VITALS
TEMPERATURE: 98.7 F | WEIGHT: 110 LBS | HEART RATE: 93 BPM | BODY MASS INDEX: 20.24 KG/M2 | HEIGHT: 62 IN | RESPIRATION RATE: 18 BRPM

## 2023-06-28 DIAGNOSIS — L97.526 NON-PRESSURE CHRONIC ULCER OF OTHER PART OF LEFT FOOT WITH BONE INVOLVEMENT WITHOUT EVIDENCE OF NECROSIS (HCC): ICD-10-CM

## 2023-06-28 DIAGNOSIS — M86.472 CHRONIC OSTEOMYELITIS OF LEFT FOOT WITH DRAINING SINUS (HCC): ICD-10-CM

## 2023-06-28 PROCEDURE — 99213 OFFICE O/P EST LOW 20 MIN: CPT
